# Patient Record
Sex: FEMALE | Race: WHITE | NOT HISPANIC OR LATINO | URBAN - METROPOLITAN AREA
[De-identification: names, ages, dates, MRNs, and addresses within clinical notes are randomized per-mention and may not be internally consistent; named-entity substitution may affect disease eponyms.]

---

## 2022-11-21 ENCOUNTER — OUTPATIENT (OUTPATIENT)
Dept: OUTPATIENT SERVICES | Facility: HOSPITAL | Age: 67
LOS: 1 days | End: 2022-11-21
Payer: COMMERCIAL

## 2022-11-21 DIAGNOSIS — F20.9 SCHIZOPHRENIA, UNSPECIFIED: ICD-10-CM

## 2022-11-21 PROCEDURE — 0225U NFCT DS DNA&RNA 21 SARSCOV2: CPT

## 2023-11-26 ENCOUNTER — INPATIENT (INPATIENT)
Facility: HOSPITAL | Age: 68
LOS: 8 days | Discharge: PSYCHIATRIC OR SUBSTANCE USE FACILITY | DRG: 872 | End: 2023-12-05
Attending: INTERNAL MEDICINE | Admitting: STUDENT IN AN ORGANIZED HEALTH CARE EDUCATION/TRAINING PROGRAM
Payer: MEDICARE

## 2023-11-26 VITALS
HEART RATE: 104 BPM | DIASTOLIC BLOOD PRESSURE: 56 MMHG | SYSTOLIC BLOOD PRESSURE: 110 MMHG | OXYGEN SATURATION: 95 % | RESPIRATION RATE: 20 BRPM

## 2023-11-26 LAB
ALBUMIN SERPL ELPH-MCNC: 3.3 G/DL — SIGNIFICANT CHANGE UP (ref 3.3–5.2)
ALP SERPL-CCNC: 127 U/L — HIGH (ref 40–120)
ALT FLD-CCNC: 56 U/L — HIGH
ANION GAP SERPL CALC-SCNC: 11 MMOL/L — SIGNIFICANT CHANGE UP (ref 5–17)
APPEARANCE UR: ABNORMAL
APTT BLD: 27.3 SEC — SIGNIFICANT CHANGE UP (ref 24.5–35.6)
AST SERPL-CCNC: 63 U/L — HIGH
BACTERIA # UR AUTO: NEGATIVE /HPF — SIGNIFICANT CHANGE UP
BASE EXCESS BLDV CALC-SCNC: 5.7 MMOL/L — HIGH (ref -2–3)
BASOPHILS # BLD AUTO: 0 K/UL — SIGNIFICANT CHANGE UP (ref 0–0.2)
BASOPHILS NFR BLD AUTO: 0 % — SIGNIFICANT CHANGE UP (ref 0–2)
BILIRUB SERPL-MCNC: 1.2 MG/DL — SIGNIFICANT CHANGE UP (ref 0.4–2)
BILIRUB UR-MCNC: ABNORMAL
BUN SERPL-MCNC: 25.7 MG/DL — HIGH (ref 8–20)
CA-I SERPL-SCNC: 1.22 MMOL/L — SIGNIFICANT CHANGE UP (ref 1.15–1.33)
CALCIUM SERPL-MCNC: 9.6 MG/DL — SIGNIFICANT CHANGE UP (ref 8.4–10.5)
CAST: 10 /LPF — HIGH (ref 0–4)
CHLORIDE BLDV-SCNC: 100 MMOL/L — SIGNIFICANT CHANGE UP (ref 96–108)
CHLORIDE SERPL-SCNC: 98 MMOL/L — SIGNIFICANT CHANGE UP (ref 96–108)
CO2 SERPL-SCNC: 27 MMOL/L — SIGNIFICANT CHANGE UP (ref 22–29)
COLOR SPEC: ABNORMAL
CREAT SERPL-MCNC: 1.19 MG/DL — SIGNIFICANT CHANGE UP (ref 0.5–1.3)
DIFF PNL FLD: ABNORMAL
EGFR: 50 ML/MIN/1.73M2 — LOW
EOSINOPHIL # BLD AUTO: 0 K/UL — SIGNIFICANT CHANGE UP (ref 0–0.5)
EOSINOPHIL NFR BLD AUTO: 0 % — SIGNIFICANT CHANGE UP (ref 0–6)
FINE GRAN CASTS #/AREA URNS AUTO: 2 — SIGNIFICANT CHANGE UP
GAS PNL BLDV: 134 MMOL/L — LOW (ref 136–145)
GAS PNL BLDV: SIGNIFICANT CHANGE UP
GLUCOSE BLDV-MCNC: 130 MG/DL — HIGH (ref 70–99)
GLUCOSE SERPL-MCNC: 129 MG/DL — HIGH (ref 70–99)
GLUCOSE UR QL: NEGATIVE MG/DL — SIGNIFICANT CHANGE UP
HCO3 BLDV-SCNC: 29 MMOL/L — SIGNIFICANT CHANGE UP (ref 22–29)
HCT VFR BLD CALC: 34 % — LOW (ref 34.5–45)
HCT VFR BLDA CALC: 36 % — SIGNIFICANT CHANGE UP
HGB BLD CALC-MCNC: 12 G/DL — SIGNIFICANT CHANGE UP (ref 11.7–16.1)
HGB BLD-MCNC: 11.5 G/DL — SIGNIFICANT CHANGE UP (ref 11.5–15.5)
INR BLD: 1.24 RATIO — HIGH (ref 0.85–1.18)
KETONES UR-MCNC: ABNORMAL MG/DL
LACTATE BLDV-MCNC: 1.4 MMOL/L — SIGNIFICANT CHANGE UP (ref 0.5–2)
LEUKOCYTE ESTERASE UR-ACNC: ABNORMAL
LYMPHOCYTES # BLD AUTO: 1.56 K/UL — SIGNIFICANT CHANGE UP (ref 1–3.3)
LYMPHOCYTES # BLD AUTO: 7 % — LOW (ref 13–44)
MANUAL SMEAR VERIFICATION: SIGNIFICANT CHANGE UP
MCHC RBC-ENTMCNC: 30.8 PG — SIGNIFICANT CHANGE UP (ref 27–34)
MCHC RBC-ENTMCNC: 33.8 GM/DL — SIGNIFICANT CHANGE UP (ref 32–36)
MCV RBC AUTO: 91.2 FL — SIGNIFICANT CHANGE UP (ref 80–100)
MONOCYTES # BLD AUTO: 0.78 K/UL — SIGNIFICANT CHANGE UP (ref 0–0.9)
MONOCYTES NFR BLD AUTO: 3.5 % — SIGNIFICANT CHANGE UP (ref 2–14)
NEUTROPHILS # BLD AUTO: 19.54 K/UL — HIGH (ref 1.8–7.4)
NEUTROPHILS NFR BLD AUTO: 87.8 % — HIGH (ref 43–77)
NITRITE UR-MCNC: POSITIVE
PCO2 BLDV: 38 MMHG — LOW (ref 39–42)
PH BLDV: 7.49 — HIGH (ref 7.32–7.43)
PH UR: 5.5 — SIGNIFICANT CHANGE UP (ref 5–8)
PLAT MORPH BLD: NORMAL — SIGNIFICANT CHANGE UP
PLATELET # BLD AUTO: 242 K/UL — SIGNIFICANT CHANGE UP (ref 150–400)
PO2 BLDV: 193 MMHG — HIGH (ref 25–45)
POLYCHROMASIA BLD QL SMEAR: SLIGHT — SIGNIFICANT CHANGE UP
POTASSIUM BLDV-SCNC: 3.5 MMOL/L — SIGNIFICANT CHANGE UP (ref 3.5–5.1)
POTASSIUM SERPL-MCNC: 3.6 MMOL/L — SIGNIFICANT CHANGE UP (ref 3.5–5.3)
POTASSIUM SERPL-SCNC: 3.6 MMOL/L — SIGNIFICANT CHANGE UP (ref 3.5–5.3)
PROT SERPL-MCNC: 7 G/DL — SIGNIFICANT CHANGE UP (ref 6.6–8.7)
PROT UR-MCNC: 300 MG/DL
PROTHROM AB SERPL-ACNC: 13.7 SEC — HIGH (ref 9.5–13)
RBC # BLD: 3.73 M/UL — LOW (ref 3.8–5.2)
RBC # FLD: 12.9 % — SIGNIFICANT CHANGE UP (ref 10.3–14.5)
RBC BLD AUTO: ABNORMAL
RBC CASTS # UR COMP ASSIST: 7 /HPF — HIGH (ref 0–4)
SAO2 % BLDV: 100 % — SIGNIFICANT CHANGE UP
SODIUM SERPL-SCNC: 136 MMOL/L — SIGNIFICANT CHANGE UP (ref 135–145)
SP GR SPEC: 1.03 — SIGNIFICANT CHANGE UP (ref 1–1.03)
SQUAMOUS # UR AUTO: 24 /HPF — HIGH (ref 0–5)
UROBILINOGEN FLD QL: 1 MG/DL — SIGNIFICANT CHANGE UP (ref 0.2–1)
VARIANT LYMPHS # BLD: 1.7 % — SIGNIFICANT CHANGE UP (ref 0–6)
WBC # BLD: 22.25 K/UL — HIGH (ref 3.8–10.5)
WBC # FLD AUTO: 22.25 K/UL — HIGH (ref 3.8–10.5)
WBC UR QL: 4 /HPF — SIGNIFICANT CHANGE UP (ref 0–5)

## 2023-11-26 PROCEDURE — 99285 EMERGENCY DEPT VISIT HI MDM: CPT | Mod: GC

## 2023-11-26 RX ORDER — MIDAZOLAM HYDROCHLORIDE 1 MG/ML
5 INJECTION, SOLUTION INTRAMUSCULAR; INTRAVENOUS ONCE
Refills: 0 | Status: DISCONTINUED | OUTPATIENT
Start: 2023-11-26 | End: 2023-11-26

## 2023-11-26 RX ORDER — DIPHENHYDRAMINE HCL 50 MG
50 CAPSULE ORAL ONCE
Refills: 0 | Status: COMPLETED | OUTPATIENT
Start: 2023-11-26 | End: 2023-11-26

## 2023-11-26 RX ORDER — HALOPERIDOL DECANOATE 100 MG/ML
5 INJECTION INTRAMUSCULAR ONCE
Refills: 0 | Status: COMPLETED | OUTPATIENT
Start: 2023-11-26 | End: 2023-11-26

## 2023-11-26 RX ADMIN — Medication 50 MILLIGRAM(S): at 20:58

## 2023-11-26 RX ADMIN — MIDAZOLAM HYDROCHLORIDE 5 MILLIGRAM(S): 1 INJECTION, SOLUTION INTRAMUSCULAR; INTRAVENOUS at 22:03

## 2023-11-26 RX ADMIN — HALOPERIDOL DECANOATE 5 MILLIGRAM(S): 100 INJECTION INTRAMUSCULAR at 20:58

## 2023-11-26 NOTE — ED PROVIDER NOTE - PRO INTERPRETER NEED 2
Mammogram non-compliant report review and/or outreach    Breast Cancer Screening      Chart review completed for the following HM test:   Care Everywhere and Media reports - updates requested and reviewed.  Mammogram       English

## 2023-11-26 NOTE — ED PROVIDER NOTE - CLINICAL SUMMARY MEDICAL DECISION MAKING FREE TEXT BOX
67 y/o female from comes to ED for "chills" per staff from [facility] poor historian non cooperative, patient declined physical exam, Urinalaysis, Vital signs, blood work. Denies fever, pain, cough, headache, nausea, vomits, diarrhea, urinary symptoms.     Plan:  1. Contacting [facility] for more information  2. Pt declining our services or evaluations at the moment. 69 y/o female from comes to ED for "chills" per staff from [facility] poor historian non cooperative, patient declined physical exam, Urinalaysis, Vital signs, blood work. Denies fever, pain, cough, headache, nausea, vomits, diarrhea, urinary symptoms.     Plan:  1. Contacting [facility] for more information  2. Pt declining our services or evaluations at the moment. 67 y/o female from comes to ED for "chills" per staff from [place] poor historian non cooperative, patient declined physical exam, Urinalaysis, Vital signs, blood work. Denies fever, pain, cough, headache, nausea, vomits, diarrhea, urinary symptoms.     Plan:  1. Contacting [facility] for more information  2. Pt declining our services or evaluations at the moment.

## 2023-11-26 NOTE — ED PROVIDER NOTE - NS ED ROS FT
Gen: No fever, no change in activity level  ENT: No congestion, no rhinorrhea  Resp: No cough, no trouble breathing  Cardiovascular: No chest pain, no palpitation  Gastrointestinal: No nausea, no vomiting, no diarrhea  :  No change in urine output; no dysuria, no hematuria  MS: No joint or muscle pain  Skin: No rashes  Neuro: No headache; no abnormal movements  Remainder negative, except as per the HPI

## 2023-11-26 NOTE — ED ADULT NURSE NOTE - OBJECTIVE STATEMENT
67 y/o female history of schizoaffective disorder presents to the ED via EMS from [facility] for fever. According to [facility] staff, pt has fevers, chills and cough/congestion. Pt refusing nursing care upon assessment, MD made aware. MD spoke to [facility] psychiatrist and was recommended to sedate pt involuntarily. Patient in no respiratory distress, no acute distress. Resp even and nonlabored. Patient safety provided with staff at bedside, call bell within reach and bed in the lowest position. 67 y/o female history of schizoaffective disorder presents to the ED via EMS from [facility] for fever. According to [facility] staff, pt has fevers, chills and cough/congestion. Pt refusing nursing care upon assessment, MD made aware. MD spoke to [facility] psychiatrist and was recommended to sedate pt involuntarily. Patient in no respiratory distress, no acute distress. Resp even and nonlabored. Patient safety provided with staff at bedside, call bell within reach and bed in the lowest position. 69 y/o female history of schizoaffective disorder presents to the ED via EMS from [facility] for fever. According to [facility] staff, pt has fevers, chills and cough/congestion. Pt refusing nursing care upon assessment, MD made aware. MD spoke to [facility] psychiatrist and was recommended to sedate pt involuntarily. Patient in no respiratory distress, no acute distress. Resp even and nonlabored. Patient safety provided with staff at bedside, call bell within reach and bed in the lowest position.

## 2023-11-26 NOTE — ED PROVIDER NOTE - PHYSICAL EXAMINATION
Pt decline physical exam. Gen: NAD, AOx3- year/location/basic situation  Head: NCAT  HEENT: EOMI, oral mucosa moist, normal conjunctiva, neck supple  Lung: CTAB, no respiratory distress  CV: rrr, no murmur, Normal perfusion  Abd: soft, NTND  MSK: +b/l LE edema, no visible deformities  Neuro: No focal neurologic deficits  Skin: No rash   Psych: flat affect

## 2023-11-26 NOTE — ED ADULT TRIAGE NOTE - CHIEF COMPLAINT QUOTE
Pt BIBA from [facility] for fever 101.6.  Hx. of schizophrenia, bipolar, violence.  Pt refusing to answer questions, refusing VS.  Per EMS, NKA.  EMS also states [facility] has outbreak of Covid in facility.  Caregiver bedside. Pt BIBA from [facility] for fever 101.6.  Hx. of schizophrenia, bipolar, violence.  Pt refusing to answer questions, refusing VS.  Per EMS, NKA.  EMS also states [facility] has outbreak of Covid in facility.  Caregiver bedside. Pt BIBA from [facility] for fever 101.6.  Hx. of schizophrenia, bipolar, violence.  Pt refusing to answer questions, refusing VS.  Per EMS, NKA.  EMS also states [facility] has outbreak of Covid in facility.  Caregiver bedside.

## 2023-11-26 NOTE — ED PROVIDER NOTE - OBJECTIVE STATEMENT
67 y/o female from comes to ED for "chills" per staff from [facility] giovany historian non cooperative, patient declined physical exam, Urinalaysis, Vital signs, blood work. Denies fever, pain, cough, headache, nausea, vomits, diarrhea, urinary symptoms. 67 y/o female from comes to ED for "chills" per staff from [facility] giovany historian non cooperative, patient declined physical exam, Urinalaysis, Vital signs, blood work. Denies fever, pain, cough, headache, nausea, vomits, diarrhea, urinary symptoms. 69 y/o female from comes to ED for "chills" per staff from [place] giovany historian non cooperative, patient declined physical exam, Urinalaysis, Vital signs, blood work. Denies fever, pain, cough, headache, nausea, vomits, diarrhea, urinary symptoms.

## 2023-11-26 NOTE — ED ADULT NURSE NOTE - CHIEF COMPLAINT QUOTE
Pt BIBA from [place] for fever 101.6.  Hx. of schizophrenia, bipolar, violence.  Pt refusing to answer questions, refusing VS.  Per EMS, NKA.  EMS also states [place] [facility] has outbreak of Covid in facility.  Caregiver bedside. Pt BIBA from [facility] for fever 101.6.  Hx. of schizophrenia, bipolar, violence.  Pt refusing to answer questions, refusing VS.  Per EMS, NKA.  EMS also states [facility] has outbreak of Covid in facility.  Caregiver bedside. Pt BIBA from [facility] for fever 101.6.  Hx. of schizophrenia, bipolar, violence.  Pt refusing to answer questions, refusing VS.  Per EMS, NKA.  EMS also states [place] [facility] has outbreak of Covid in facility.  Caregiver bedside.

## 2023-11-26 NOTE — ED ADULT NURSE REASSESSMENT NOTE - NS ED NURSE REASSESS COMMENT FT1
Pt refusing all types nursing care and given haldol, benadryl and versed as per MD orders. Pt more compliant after medications to obtain vitals, IV insertion, blood work and collect urine. Pt placed on CM and .  Pt in no acute distress, no resp distress, resps even and nonlabored, resting comfortably in bed with [place] staff at bedside. Pt refusing all types nursing care and given haldol, benadryl and versed as per MD orders. Pt more compliant after medications to obtain vitals, IV insertion, blood work and collect urine. Pt placed on CM and .  Pt in no acute distress, no resp distress, resps even and nonlabored, resting comfortably in bed with [facility] staff at bedside. Pt refusing all types nursing care and given haldol, benadryl and versed as per MD orders. Pt more compliant after medications to obtain vitals, IV insertion, blood work and collect urine. Pt placed on CM and .  Pt in no acute distress, no resp distress, resps even and nonlabored, resting comfortably in bed with [place] staff at bedside.

## 2023-11-26 NOTE — ED PROVIDER NOTE - ATTENDING CONTRIBUTION TO CARE
I, Janneth Camp, have personally seen and examined this patient. I have fully participated in the care of this patient. I have reviewed all pertinent clinical information, including history, physical exam, plan and the Resident's note and agree except as noted below.     88-year-old female with bipolar and schizoaffective disorder involuntarily committed to [facility] presenting for fever 1 day 101.  Had a negative rapid COVID there was COVID exposure at [facility].  Spoke with psychiatrist Dr Abdullahi Vora  who states patient does not have the ability to refuse at this time due to psychiatric illness and concern for bacterial illness with elevated temperature and negative COVID.  Will give IM sedation as patient is refusing vitals and lab work.  Tried to persuade patient to cooperate and she refused and got slightly agitated grabbing at staff.  On exam patient has no obvious findings abdomen is soft and nontender lungs are clear mild lower extremity swelling.  sepsis labs ordered urine chest x-ray reassess I, Janneth Camp, have personally seen and examined this patient. I have fully participated in the care of this patient. I have reviewed all pertinent clinical information, including history, physical exam, plan and the Resident's note and agree except as noted below.     88-year-old female with bipolar and schizoaffective disorder involuntarily committed to [facility] presenting for fever 1 day 101.  Had a negative rapid COVID there was COVID exposure at [place].  Spoke with psychiatrist Dr Abdullahi Vora  who states patient does not have the ability to refuse at this time due to psychiatric illness and concern for bacterial illness with elevated temperature and negative COVID.  Will give IM sedation as patient is refusing vitals and lab work.  Tried to persuade patient to cooperate and she refused and got slightly agitated grabbing at staff.  On exam patient has no obvious findings abdomen is soft and nontender lungs are clear mild lower extremity swelling.  sepsis labs ordered urine chest x-ray reassess I, Janneth Camp, have personally seen and examined this patient. I have fully participated in the care of this patient. I have reviewed all pertinent clinical information, including history, physical exam, plan and the Resident's note and agree except as noted below.     88-year-old female with bipolar and schizoaffective disorder involuntarily committed to [facility] presenting for fever 1 day 101.  Had a negative rapid COVID there was COVID exposure at [facility].  Spoke with psychiatrist Dr Abdullahi Vora  who states patient does not have the ability to refuse at this time due to psychiatric illness and concern for bacterial illness with elevated temperature and negative COVID.  Will give IM sedation as patient is refusing vitals and lab work.  Tried to persuade patient to cooperate and she refused and got slightly agitated grabbing at staff.  On exam patient has no obvious findings abdomen is soft and nontender lungs are clear mild lower extremity swelling.  sepsis labs ordered urine chest x-ray reassess

## 2023-11-26 NOTE — ED ADULT NURSE NOTE - NSFALLUNIVINTERV_ED_ALL_ED
Bed/Stretcher in lowest position, wheels locked, appropriate side rails in place/Call bell, personal items and telephone in reach/Instruct patient to call for assistance before getting out of bed/chair/stretcher/Non-slip footwear applied when patient is off stretcher/[place] to call system/Physically safe environment - no spills, clutter or unnecessary equipment/Purposeful proactive rounding/Room/bathroom lighting operational, light cord in reach Bed/Stretcher in lowest position, wheels locked, appropriate side rails in place/Call bell, personal items and telephone in reach/Instruct patient to call for assistance before getting out of bed/chair/stretcher/Non-slip footwear applied when patient is off stretcher/[place] to call system/Physically safe environment - no spills, clutter or unnecessary equipment/Purposeful proactive rounding/Room/bathroom lighting operational, light cord in reach Bed/Stretcher in lowest position, wheels locked, appropriate side rails in place/Call bell, personal items and telephone in reach/Instruct patient to call for assistance before getting out of bed/chair/stretcher/Non-slip footwear applied when patient is off stretcher/[place] to call system/Physically safe environment - no spills, clutter or unnecessary equipment/Purposeful proactive rounding/Room/bathroom lighting operational, light cord in reach

## 2023-11-27 DIAGNOSIS — N39.0 URINARY TRACT INFECTION, SITE NOT SPECIFIED: ICD-10-CM

## 2023-11-27 DIAGNOSIS — E55.9 VITAMIN D DEFICIENCY, UNSPECIFIED: ICD-10-CM

## 2023-11-27 DIAGNOSIS — E78.5 HYPERLIPIDEMIA, UNSPECIFIED: ICD-10-CM

## 2023-11-27 DIAGNOSIS — I10 ESSENTIAL (PRIMARY) HYPERTENSION: ICD-10-CM

## 2023-11-27 DIAGNOSIS — Z78.9 OTHER SPECIFIED HEALTH STATUS: Chronic | ICD-10-CM

## 2023-11-27 LAB
-  K. PNEUMONIAE GROUP: SIGNIFICANT CHANGE UP
CULTURE RESULTS: NO GROWTH — SIGNIFICANT CHANGE UP
GRAM STN FLD: ABNORMAL
METHOD TYPE: SIGNIFICANT CHANGE UP
RAPID RVP RESULT: SIGNIFICANT CHANGE UP
SARS-COV-2 RNA SPEC QL NAA+PROBE: SIGNIFICANT CHANGE UP
SPECIMEN SOURCE: SIGNIFICANT CHANGE UP

## 2023-11-27 PROCEDURE — 99221 1ST HOSP IP/OBS SF/LOW 40: CPT

## 2023-11-27 PROCEDURE — 99223 1ST HOSP IP/OBS HIGH 75: CPT

## 2023-11-27 RX ORDER — HALOPERIDOL DECANOATE 100 MG/ML
2 INJECTION INTRAMUSCULAR EVERY 6 HOURS
Refills: 0 | Status: DISCONTINUED | OUTPATIENT
Start: 2023-11-27 | End: 2023-11-27

## 2023-11-27 RX ORDER — ENOXAPARIN SODIUM 100 MG/ML
40 INJECTION SUBCUTANEOUS EVERY 24 HOURS
Refills: 0 | Status: DISCONTINUED | OUTPATIENT
Start: 2023-11-27 | End: 2023-12-05

## 2023-11-27 RX ORDER — ACETAMINOPHEN 500 MG
650 TABLET ORAL EVERY 6 HOURS
Refills: 0 | Status: DISCONTINUED | OUTPATIENT
Start: 2023-11-27 | End: 2023-12-05

## 2023-11-27 RX ORDER — ONDANSETRON 8 MG/1
4 TABLET, FILM COATED ORAL EVERY 6 HOURS
Refills: 0 | Status: DISCONTINUED | OUTPATIENT
Start: 2023-11-27 | End: 2023-12-05

## 2023-11-27 RX ORDER — CEFTRIAXONE 500 MG/1
1000 INJECTION, POWDER, FOR SOLUTION INTRAMUSCULAR; INTRAVENOUS ONCE
Refills: 0 | Status: COMPLETED | OUTPATIENT
Start: 2023-11-27 | End: 2023-11-27

## 2023-11-27 RX ORDER — HALOPERIDOL DECANOATE 100 MG/ML
2.5 INJECTION INTRAMUSCULAR EVERY 6 HOURS
Refills: 0 | Status: DISCONTINUED | OUTPATIENT
Start: 2023-11-27 | End: 2023-11-28

## 2023-11-27 RX ORDER — CEFTRIAXONE 500 MG/1
1000 INJECTION, POWDER, FOR SOLUTION INTRAMUSCULAR; INTRAVENOUS EVERY 24 HOURS
Refills: 0 | Status: DISCONTINUED | OUTPATIENT
Start: 2023-11-28 | End: 2023-11-28

## 2023-11-27 RX ADMIN — CEFTRIAXONE 1000 MILLIGRAM(S): 500 INJECTION, POWDER, FOR SOLUTION INTRAMUSCULAR; INTRAVENOUS at 04:38

## 2023-11-27 NOTE — BH CONSULTATION LIAISON ASSESSMENT NOTE - OTHER
chart [place] transfer packet chart [place] transfer packet chart [place] transfer packet [place] external transfer packet [facility] psych center [facility] psych center [facility] psych center

## 2023-11-27 NOTE — BH CONSULTATION LIAISON ASSESSMENT NOTE - NSBHCONSULTRECOMMENDOTHER_PSY_A_CORE FT
haloperidol oral solution 10 mg PO daily and 5 mg po hs when able to take PO meds    surrogate consent from MARYLU [facility] [phone] (per [place] chart) no HCP noted haloperidol oral solution 10 mg PO daily and 5 mg po hs when able to take PO meds    surrogate consent from MARYLU [facility] [phone] (per [place] chart) no HCP noted haloperidol oral solution 10 mg PO daily and 5 mg po hs when able to take PO meds    surrogate consent from MARYLU Wilcox [phone] (per [place] chart) no HCP noted

## 2023-11-27 NOTE — BH CONSULTATION LIAISON ASSESSMENT NOTE - NSBHREFEROTHER_PSY_A_CORE_FT
[facility] nursing office [phone] [facility] nursing office [phone] [facility] nursing office [phone]

## 2023-11-27 NOTE — H&P ADULT - HISTORY OF PRESENT ILLNESS
68yoF hx schizoaffective disorder, bipolar type sent from [facility] for fever of 101.6.  Hx obtained from chart review as pt sedated from receiving meds by ED and not providing any meaningful information at this time.  Pt with fever as above and was also noted to be tachycardic.  Pt refused to take BP at facility and per documentation as a tendency to not comply with vital sign checks and testing. It is unclear if pt reported any symptoms to staff,  Upon arrival to ED, pt refused vital signs and testing and had to be sedated with multiple agents.  Labs notable for leukocytosis and abnormal UA. 68yoF hx schizoaffective disorder, bipolar type sent from [facility] for fever of 101.6.  Hx obtained from chart review as pt sedated from receiving meds by ED and not providing any meaningful information at this time.  Pt with fever as above and was also noted to be tachycardic.  Pt refused to take BP at facility and per documentation as a tendency to not comply with vital sign checks and testing. It is unclear if pt reported any symptoms to staff,  Upon arrival to ED, pt refused vital signs and testing and had to be sedated with multiple agents.  Labs notable for leukocytosis and abnormal UA. 68yoF hx schizoaffective disorder, bipolar type sent from [facility] for fever of 101.6.  Hx obtained from chart review as pt sedated from receiving meds by ED and not providing any meaningful information at this time.  Pt with fever as above and was also noted to be tachycardic.  Pt refused to take BP at facility and per documentation as a tendency to not comply with vital sign checks and testing. It is unclear if pt reported any symptoms to staff,  Upon arrival to ED, pt refused vital signs and testing and had to be sedated with multiple agents.  Labs notable for leukocytosis and abnormal UA.

## 2023-11-27 NOTE — BH CONSULTATION LIAISON ASSESSMENT NOTE - NSBHCHARTREVIEWVS_PSY_A_CORE FT
Vital Signs Last 24 Hrs  T(C): 36.6 (27 Nov 2023 00:13), Max: 36.6 (27 Nov 2023 00:13)  T(F): 97.8 (27 Nov 2023 00:13), Max: 97.8 (27 Nov 2023 00:13)  HR: 99 (27 Nov 2023 00:13) (99 - 104)  BP: 114/58 (27 Nov 2023 00:13) (110/56 - 114/58)  BP(mean): --  RR: 18 (27 Nov 2023 00:13) (18 - 20)  SpO2: 92% (27 Nov 2023 00:13) (92% - 95%)    Parameters below as of 27 Nov 2023 00:13  Patient On (Oxygen Delivery Method): room air

## 2023-11-27 NOTE — BH CONSULTATION LIAISON ASSESSMENT NOTE - DETAILS
Samara Anderson -[phone] [place] Samara Anderson -[phone] [place] Samara Anderson -[phone] [place] NA

## 2023-11-27 NOTE — BH CONSULTATION LIAISON ASSESSMENT NOTE - CURRENT MEDICATION
MEDICATIONS  (STANDING):  acetaminophen  Suppository .. 650 milliGRAM(s) Rectal every 6 hours  enoxaparin Injectable 40 milliGRAM(s) SubCutaneous every 24 hours    MEDICATIONS  (PRN):  haloperidol    Injectable 2 milliGRAM(s) IV Push every 6 hours PRN Agitation  ondansetron Injectable 4 milliGRAM(s) IV Push every 6 hours PRN Nausea and/or Vomiting

## 2023-11-27 NOTE — PROVIDER CONTACT NOTE (CRITICAL VALUE NOTIFICATION) - PERSON GIVING RESULT:
Iraj Ortiz / [facility] Iraj Ortiz / [facility] Iraj Ortiz / [facility]

## 2023-11-27 NOTE — H&P ADULT - NSHPPHYSICALEXAM_GEN_ALL_CORE
Vital Signs Last 24 Hrs  T(C): 36.6 (27 Nov 2023 00:13), Max: 36.6 (27 Nov 2023 00:13)  T(F): 97.8 (27 Nov 2023 00:13), Max: 97.8 (27 Nov 2023 00:13)  HR: 99 (27 Nov 2023 00:13) (99 - 104)  BP: 114/58 (27 Nov 2023 00:13) (110/56 - 114/58)  BP(mean): --  RR: 18 (27 Nov 2023 00:13) (18 - 20)  SpO2: 92% (27 Nov 2023 00:13) (92% - 95%)    Parameters below as of 27 Nov 2023 00:13  Patient On (Oxygen Delivery Method): room air    GENERAL: Lethargic middle age woman, easily agitated   EYES:  Clear conjunctiva, extraocular movement intact  ENT: Moist mucous membranes  RESP:  Non-labored breathing pattern, declines to have ausculation performed   CV: Declines chest exam no evident lower extremity edema  GI: Soft, non-distended  NEURO: Somewhat lethargic but able to speak and noted to move some extremities spontaneously, declines to follow commands  PSYCH: Not cooperative, becomes agitated when spoken to   SKIN: No obvious rash or lesions

## 2023-11-27 NOTE — CHART NOTE - NSCHARTNOTEFT_GEN_A_CORE
Pt seen and examined at bedside this AM. Unable to obtain history due to psychiatric disorder.     VITALS:   T(C): 36.6 (11-27-23 @ 00:13), Max: 36.6 (11-27-23 @ 00:13)  HR: 99 (11-27-23 @ 00:13) (99 - 104)  BP: 114/58 (11-27-23 @ 00:13) (110/56 - 114/58)  RR: 18 (11-27-23 @ 00:13) (18 - 20)  SpO2: 92% (11-27-23 @ 00:13) (92% - 95%)    GENERAL: sleeping but become agitated when awake  HEAD:  Atraumatic, Normocephalic  NECK: Supple, No JVD  CHEST/LUNG: Unable to exam due to pt refusal  HEART: Unable to exam due to pt refusal  ABDOMEN: Unable to exam due to pt refusal  NERVOUS SYSTEM:  Unable to assess, moving all extremities     #UTI  f/u BCx/UCx  CTX    #schizoaffective disorder, bipolar type with behavioral disturbance  psych consulted, kenzie recs    rest of care per HPI

## 2023-11-27 NOTE — H&P ADULT - ASSESSMENT
68yoF hx schizoaffective disorder, bipolar type sent from [facility] for fever of 101.6, on admission found to have leukocytosis and abnormal UA    Suspected UTI  -Pt poor historian, unclear if active urinary symptoms  -Received ceftriaxone by ED, will continue  -Urine and blood cultures pending    Leukocytosis  -Due to infectious process above, monitor  -CXR ordered and pending    Hx schizoaffective disorder, bipolar type with behavioral disturbance  -Received IM haldol, IM versed and IM Benadryl  by ED for agitation   -Somewhat sedated, but beginning to wake up and speak  -RN to perform bedside dysphagia screen, please follow up, will keep NPO in meantime  -On long-acting haldol injections and haldol solution per transfer meds  -Will order IV haldol 2mg PRN agitation for now  -Please call psychiatry consult in AM    Prophylactic measure  -Lovenox 40mg daily 68yoF hx schizoaffective disorder, bipolar type sent from [facility] for fever of 101.6, on admission found to have leukocytosis and abnormal UA    Suspected UTI  -Pt poor historian, unclear if active urinary symptoms  -Received ceftriaxone by ED, will continue  -Urine and blood cultures pending    Leukocytosis  -Due to infectious process above, monitor  -CXR ordered and pending    Hx schizoaffective disorder, bipolar type with behavioral disturbance  -Received IM haldol, IM versed and IM Benadryl  by ED for agitation   -Somewhat sedated, but beginning to wake up and speak  -RN to perform bedside dysphagia screen, please follow up, will keep NPO in meantime  -On long-acting haldol injections and haldol solution per transfer meds  -Will order IV haldol 2mg PRN agitation for now  -Please call psychiatry consult in AM    Prophylactic measure  -Lovenox 40mg daily 68yoF hx schizoaffective disorder, bipolar type sent from [facility] for fever of 101.6, on admission found to have leukocytosis and abnormal UA    Suspected UTI  -Pt poor historian, unclear if active urinary symptoms  -Received ceftriaxone by ED, will continue  -Urine and blood cultures pending    Leukocytosis  -Due to infectious process above, monitor  -CXR ordered and pending    Hx schizoaffective disorder, bipolar type with behavioral disturbance  -Received IM haldol, IM versed and IM Benadryl  by ED for agitation   -Somewhat sedated, but beginning to wake up and speak  -RN to perform bedside dysphagia screen, please follow up, will keep NPO in meantime  -On long-acting haldol injections and haldol solution per transfer meds  -Will order IV haldol 2mg PRN agitation for now  -Please call psychiatry consult in AM    Prophylactic measure  -Lovenox 40mg daily

## 2023-11-27 NOTE — BH CONSULTATION LIAISON ASSESSMENT NOTE - HPI (INCLUDE ILLNESS QUALITY, SEVERITY, DURATION, TIMING, CONTEXT, MODIFYING FACTORS, ASSOCIATED SIGNS AND SYMPTOMS)
admitted with UTI - sepsis  patient has been agitated then non verbal aide reports she can talk but chooses not to answer questions  onset of fever chills tachycardia  found to have UTI  received haloperidol decanoate 400 mg IM on 11/15/23  reported non compliance with vital signs and medications - history of treatment over objection  h/o self injurious behaviors  multiple suicide attempts 2011 2012 and aggression

## 2023-11-27 NOTE — H&P ADULT - NSHPLABSRESULTS_GEN_ALL_CORE
11-26    136  |  98  |  25.7<H>  ----------------------------<  129<H>  3.6   |  27.0  |  1.19    Ca    9.6      26 Nov 2023 22:47    TPro  7.0  /  Alb  3.3  /  TBili  1.2  /  DBili  x   /  AST  63<H>  /  ALT  56<H>  /  AlkPhos  127<H>  11-26                            11.5   22.25 )-----------( 242      ( 26 Nov 2023 22:47 )             34.0

## 2023-11-27 NOTE — BH CONSULTATION LIAISON ASSESSMENT NOTE - RISK ASSESSMENT
elevated risk of aggressive or self injurious behaviors per history in [place] transfer summary elevated risk of aggressive or self injurious behaviors per history in [place] transfer summary elevated risk of aggressive or self injurious behaviors per history in [place] transfer summary

## 2023-11-27 NOTE — ED ADULT NURSE REASSESSMENT NOTE - NS ED NURSE REASSESS COMMENT FT1
pt awake, resps even and unlabored. Pt refusing being on CM and . Pt refusing all other interventions at this time. MD [place] made aware.

## 2023-11-27 NOTE — BH CONSULTATION LIAISON ASSESSMENT NOTE - SUMMARY
68 yr old female from [place] admitted for urosepsis h/o chronic schizoaffective disorder resistive to treatment 68 yr old female from [place] admitted for urosepsis h/o chronic schizoaffective disorder resistive to treatment 68 yr old female from [place] admitted for urosepsis h/o chronic schizoaffective disorder resistive to treatment

## 2023-11-27 NOTE — CHART NOTE - NSCHARTNOTEFT_GEN_A_CORE
Called by RN w/ prelim result of +BC w/ gram negative rods in anaerobic bottle.  Pt is on Rocephin, per RN, pt hasn't been compliant w/ IV placement, day team aware.  Informed RN to place IV and start antibiotics. Called by RN w/ prelim result of +BC w/ gram negative rods.  Pt is on Rocephin, per RN, pt hasn't been compliant w/ IV placement, day team aware.  Informed RN to place IV and start antibiotics.  Will place ID consult. Called by RN w/ prelim result of +BC w/ gram negative rods.  Pt is on Rocephin, per RN, pt hasn't been compliant w/ IV placement, day team aware.  Informed RN to place IV and start antibiotics.  Will place ID consult.    23:00  Called by RN after obtaining peripheral IV, pt attempts to remove it, needed to be constantly redirected by pilgrim 1:1.  Pt w/ schizoaffective disorder, as per  consult, has poor insight on need for treatment, non compliance.  Pulled out previously placed IV in ED as per RN.  Due to medical management interference, will place b/l wrist restraint.  RN to reevaluate need for continued restraint and d/c if appropriate. Called by RN w/ prelim result of +BC w/ gram negative rods.  Pt is on Rocephin, per RN, pt hasn't been compliant w/ IV placement, day team aware.  Informed RN to place IV and start antibiotics.  Will place ID consult.    23:00  Called by RN after obtaining peripheral IV, pt attempts to remove it, needed to be constantly redirected by pilgrim 1:1.  Pt w/ schizoaffective disorder, as per  consult, has poor insight on need for treatment, non compliance.  Pulled out previously placed IV in ED as per RN.  Due to medical management interference, will place b/l wrist restraint.  RN to reevaluate need for continued restraint and d/c if appropriate.    11/28  00:45  ICU Vital Signs Last 24 Hrs  T(C): 39.2 (28 Nov 2023 00:35), Max: 39.2 (28 Nov 2023 00:35)  T(F): 102.6 (28 Nov 2023 00:35), Max: 102.6 (28 Nov 2023 00:35) Axillary  HR: 144 (28 Nov 2023 00:35) (144 - 144)  BP: 129/79 (28 Nov 2023 00:35) (129/79 - 129/79)  BP(mean): --  ABP: --  ABP(mean): --  RR: 18 (28 Nov 2023 00:35) (18 - 18)  SpO2: 94% (28 Nov 2023 00:35) (94% - 94%)    O2 Parameters below as of 28 Nov 2023 00:35  Patient On (Oxygen Delivery Method): room air    Ofirmev 1gm IVPB stat  LR 1L IVF bolus stat  Monitor and reassess for change in pt's status Called by RN w/ prelim result of +BC w/ gram negative rods.  Pt is on Rocephin, per RN, pt hasn't been compliant w/ IV placement, day team aware.  Informed RN to place IV and start antibiotics.  Will place ID consult.    23:00  Called by RN after obtaining peripheral IV, pt attempts to remove it, needed to be constantly redirected by pilgrim 1:1.  Pt w/ schizoaffective disorder, as per BH consult, has poor insight on need for treatment, non compliance.  Pulled out previously placed IV in ED as per RN.  Due to medical management interference, will place b/l wrist restraint.  RN to reevaluate need for continued restraint and d/c if appropriate.    11/28  00:45  ICU Vital Signs Last 24 Hrs  T(C): 39.2 (28 Nov 2023 00:35), Max: 39.2 (28 Nov 2023 00:35)  T(F): 102.6 (28 Nov 2023 00:35), Max: 102.6 (28 Nov 2023 00:35) Axillary  HR: 144 (28 Nov 2023 00:35) (144 - 144)  BP: 129/79 (28 Nov 2023 00:35) (129/79 - 129/79)  BP(mean): --  ABP: --  ABP(mean): --  RR: 18 (28 Nov 2023 00:35) (18 - 18)  SpO2: 94% (28 Nov 2023 00:35) (94% - 94%)    O2 Parameters below as of 28 Nov 2023 00:35  Patient On (Oxygen Delivery Method): room air    Ofirmev 1gm IVPB stat  LR 1L IVF bolus stat  Monitor and reassess for change in pt's status    01:45  As per ID Pharmacist recommendation, Rocephin changed to 2gm daily.

## 2023-11-27 NOTE — PROVIDER CONTACT NOTE (CRITICAL VALUE NOTIFICATION) - PERSON GIVING RESULT:
[facility] / [facility] Iraj Ortiz / [facility] Iraj Ortiz / [facility]

## 2023-11-27 NOTE — ED ADULT NURSE REASSESSMENT NOTE - NS ED NURSE REASSESS COMMENT FT1
Received patient in bed, appears comfortable at this time.  Pt is noncompliant with monitoring and v/s at this time, pt removed IV line, receiving RN aware.  Per report medicine team aware.  Safety maintained with bed locked in lowest position.

## 2023-11-28 DIAGNOSIS — F25.9 SCHIZOAFFECTIVE DISORDER, UNSPECIFIED: ICD-10-CM

## 2023-11-28 LAB
ALBUMIN SERPL ELPH-MCNC: 2.6 G/DL — LOW (ref 3.3–5.2)
ALP SERPL-CCNC: 145 U/L — HIGH (ref 40–120)
ALT FLD-CCNC: 46 U/L — HIGH
ANION GAP SERPL CALC-SCNC: 12 MMOL/L — SIGNIFICANT CHANGE UP (ref 5–17)
AST SERPL-CCNC: 40 U/L — HIGH
BILIRUB SERPL-MCNC: 0.3 MG/DL — LOW (ref 0.4–2)
BUN SERPL-MCNC: 19.3 MG/DL — SIGNIFICANT CHANGE UP (ref 8–20)
CALCIUM SERPL-MCNC: 8.8 MG/DL — SIGNIFICANT CHANGE UP (ref 8.4–10.5)
CHLORIDE SERPL-SCNC: 102 MMOL/L — SIGNIFICANT CHANGE UP (ref 96–108)
CO2 SERPL-SCNC: 25 MMOL/L — SIGNIFICANT CHANGE UP (ref 22–29)
CREAT SERPL-MCNC: 0.85 MG/DL — SIGNIFICANT CHANGE UP (ref 0.5–1.3)
EGFR: 76 ML/MIN/1.73M2 — SIGNIFICANT CHANGE UP
GLUCOSE SERPL-MCNC: 133 MG/DL — HIGH (ref 70–99)
HCT VFR BLD CALC: 32.1 % — LOW (ref 34.5–45)
HCV AB S/CO SERPL IA: 0.09 S/CO — SIGNIFICANT CHANGE UP (ref 0–0.99)
HCV AB SERPL-IMP: SIGNIFICANT CHANGE UP
HGB BLD-MCNC: 10.5 G/DL — LOW (ref 11.5–15.5)
MCHC RBC-ENTMCNC: 30.5 PG — SIGNIFICANT CHANGE UP (ref 27–34)
MCHC RBC-ENTMCNC: 32.7 GM/DL — SIGNIFICANT CHANGE UP (ref 32–36)
MCV RBC AUTO: 93.3 FL — SIGNIFICANT CHANGE UP (ref 80–100)
PLATELET # BLD AUTO: 240 K/UL — SIGNIFICANT CHANGE UP (ref 150–400)
POTASSIUM SERPL-MCNC: 3.6 MMOL/L — SIGNIFICANT CHANGE UP (ref 3.5–5.3)
POTASSIUM SERPL-SCNC: 3.6 MMOL/L — SIGNIFICANT CHANGE UP (ref 3.5–5.3)
PROT SERPL-MCNC: 6.5 G/DL — LOW (ref 6.6–8.7)
RBC # BLD: 3.44 M/UL — LOW (ref 3.8–5.2)
RBC # FLD: 13.4 % — SIGNIFICANT CHANGE UP (ref 10.3–14.5)
SODIUM SERPL-SCNC: 139 MMOL/L — SIGNIFICANT CHANGE UP (ref 135–145)
WBC # BLD: 15.52 K/UL — HIGH (ref 3.8–10.5)
WBC # FLD AUTO: 15.52 K/UL — HIGH (ref 3.8–10.5)

## 2023-11-28 PROCEDURE — 99231 SBSQ HOSP IP/OBS SF/LOW 25: CPT

## 2023-11-28 PROCEDURE — 99233 SBSQ HOSP IP/OBS HIGH 50: CPT

## 2023-11-28 PROCEDURE — 99223 1ST HOSP IP/OBS HIGH 75: CPT

## 2023-11-28 RX ORDER — SODIUM CHLORIDE 9 MG/ML
1000 INJECTION, SOLUTION INTRAVENOUS ONCE
Refills: 0 | Status: COMPLETED | OUTPATIENT
Start: 2023-11-28 | End: 2023-11-28

## 2023-11-28 RX ORDER — ACETAMINOPHEN 500 MG
1000 TABLET ORAL ONCE
Refills: 0 | Status: COMPLETED | OUTPATIENT
Start: 2023-11-28 | End: 2023-11-28

## 2023-11-28 RX ORDER — CEFTRIAXONE 500 MG/1
2000 INJECTION, POWDER, FOR SOLUTION INTRAMUSCULAR; INTRAVENOUS EVERY 24 HOURS
Refills: 0 | Status: DISCONTINUED | OUTPATIENT
Start: 2023-11-28 | End: 2023-11-29

## 2023-11-28 RX ORDER — HALOPERIDOL DECANOATE 100 MG/ML
5 INJECTION INTRAMUSCULAR EVERY 6 HOURS
Refills: 0 | Status: DISCONTINUED | OUTPATIENT
Start: 2023-11-28 | End: 2023-11-28

## 2023-11-28 RX ORDER — HALOPERIDOL DECANOATE 100 MG/ML
5 INJECTION INTRAMUSCULAR EVERY 6 HOURS
Refills: 0 | Status: DISCONTINUED | OUTPATIENT
Start: 2023-11-28 | End: 2023-12-01

## 2023-11-28 RX ADMIN — Medication 400 MILLIGRAM(S): at 00:43

## 2023-11-28 RX ADMIN — SODIUM CHLORIDE 1000 MILLILITER(S): 9 INJECTION, SOLUTION INTRAVENOUS at 00:43

## 2023-11-28 RX ADMIN — HALOPERIDOL DECANOATE 5 MILLIGRAM(S): 100 INJECTION INTRAMUSCULAR at 08:53

## 2023-11-28 RX ADMIN — Medication 400 MILLIGRAM(S): at 14:38

## 2023-11-28 RX ADMIN — Medication 1000 MILLIGRAM(S): at 01:24

## 2023-11-28 RX ADMIN — CEFTRIAXONE 2000 MILLIGRAM(S): 500 INJECTION, POWDER, FOR SOLUTION INTRAMUSCULAR; INTRAVENOUS at 02:20

## 2023-11-28 NOTE — CONSULT NOTE ADULT - SUBJECTIVE AND OBJECTIVE BOX
[facility]                                                INFECTIOUS DISEASES  =======================================================                     Lloyd Olea#   Armani Reed MD#   Ismael Bolaños MD*                           Suzan Gallardo MD*   Deysi Rodriguez MD*            Diplomates American Board of Internal Medicine & Infectious Diseases                  # [place] Office - Appt - Tel  [phone] Fax [phone]                * [place] Office - Appt - Tel [phone] Fax [phone]                                  Hospital Consult line:  [phone]  =======================================================      [id number]  SARAH BUTTERFIELD   HPI:  68yoF hx schizoaffective disorder, bipolar type sent from [facility] for fever of 101.6.  Hx obtained from chart review as pt sedated from receiving meds by ED and not providing any meaningful information at this time.  Pt with fever as above and was also noted to be tachycardic.  Pt refused to take BP at facility and per documentation as a tendency to not comply with vital sign checks and testing. It is unclear if pt reported any symptoms to staff,  Upon arrival to ED, pt refused vital signs and testing and had to be sedated with multiple agents.  Labs notable for leukocytosis and abnormal UA. (27 Nov 2023 05:53)    ID called for klebsiella bacteremia  pt agitated combative uncooperative, unable to obtain any history        I have personally reviewed the labs and data; pertinent labs and data are listed in this note; please see below.   =======================================================  Past Medical & Surgical Hx:  =====================  PAST MEDICAL & SURGICAL HISTORY:  Schizoaffective disorder      Surgical history unknown        Problem List:  ==========  HEALTH ISSUES - PROBLEM Dx:  Acute UTI    Hypertension    Hyperlipidemia    Vitamin D deficiency    Schizoaffective disorder          Social Hx:  =======  no toxic habits currently    FAMILY HISTORY:  Family history unknown    no significant family history of immunosuppressive disorders in mother or father   =======================================================    REVIEW OF SYSTEMS:  limited patient answers to "no" to every question and states "i dont have an infection"    =======================================================  Allergies    No Known Allergies    Intolerances    Antibiotics:  cefTRIAXone Injectable. 2000 milliGRAM(s) IV Push every 24 hours    Other medications:  acetaminophen  Suppository .. 650 milliGRAM(s) Rectal every 6 hours  enoxaparin Injectable 40 milliGRAM(s) SubCutaneous every 24 hours   cefTRIAXone Injectable.   2000 milliGRAM(s) IV Push (11-28-23 @ 02:20)    cefTRIAXone Injectable.   1000 milliGRAM(s) IV Push (11-27-23 @ 04:38)      ======================================================  Physical Exam:  ============  T(F): 98.9 (28 Nov 2023 07:44), Max: 102.6 (28 Nov 2023 00:35)  HR: 101 (28 Nov 2023 07:44)  BP: 149/85 (28 Nov 2023 07:44)  RR: 18 (28 Nov 2023 07:44)  SpO2: 98% (28 Nov 2023 07:44) (94% - 98%)  temp max in last 48H T(F): , Max: 102.6 (11-28-23 @ 00:35)  Weight (kg): 85 (11-28-23 @ 00:35)    General:  No acute distress, elderly female sitting in bed  Respiratory: Lungs are clear to auscultation, Respirations are non-labored.  Cardiovascular: Normal rate, Regular rhythm, s1 + s2  Gastrointestinal: Soft, Non-tender, Non-distended, Normal bowel sounds.  Genitourinary: No costovertebral angle tenderness.  Integumentary: No rash.  Neurologic: Alert, Oriented, No focal deficits  Psychiatric: awake and alert , easily agitated, angry, uncooperative with exam    =======================================================  Labs:                        10.5   15.52 )-----------( 240      ( 28 Nov 2023 03:33 )             32.1     11-28    139  |  102  |  19.3  ----------------------------<  133<H>  3.6   |  25.0  |  0.85    Ca    8.8      28 Nov 2023 03:33    TPro  6.5<L>  /  Alb  2.6<L>  /  TBili  0.3<L>  /  DBili  x   /  AST  40<H>  /  ALT  46<H>  /  AlkPhos  145<H>  11-28      Culture - Blood (collected 11-26-23 @ 22:52)  Source: .Blood Blood-Peripheral  Gram Stain (11-27-23 @ 21:57):    Growth in aerobic bottle: Gram Negative Rods    Culture - Urine (collected 11-26-23 @ 22:47)  Source: Clean Catch Clean Catch (Midstream)  Final Report (11-27-23 @ 22:38):    No growth    Culture - Blood (collected 11-26-23 @ 22:47)  Source: .Blood Blood-Peripheral  Gram Stain (11-27-23 @ 19:59):    Growth in anaerobic bottle: Gram Negative Rods  Organism: Blood Culture PCR (11-27-23 @ 22:08)  Organism: Blood Culture PCR (11-27-23 @ 22:08)    Sensitivities:      Method Type: PCR      -  K. pneumoniae group: Detec (K. pneumoniae, K. quasipneumoniae, K. variicola)       SARS-CoV-2: NotDetec (11-26-23 @ 22:47)                                                   [facility]                                                INFECTIOUS DISEASES  =======================================================                     Lloyd Olea#   Armani Reed MD#   Ismael Bolaños MD*                           Suzan Gallardo MD*   Deysi Rodriguez MD*            Diplomates American Board of Internal Medicine & Infectious Diseases                  # [place] Office - Appt - Tel  [phone] Fax [phone]                * [place] [employer] Office - Appt - Tel [phone] Fax [phone]                                  Hospital Consult line:  [phone]  =======================================================      [id number]  SARAH BUTTERFIELD   HPI:  68yoF hx schizoaffective disorder, bipolar type sent from [facility] for fever of 101.6.  Hx obtained from chart review as pt sedated from receiving meds by ED and not providing any meaningful information at this time.  Pt with fever as above and was also noted to be tachycardic.  Pt refused to take BP at facility and per documentation as a tendency to not comply with vital sign checks and testing. It is unclear if pt reported any symptoms to staff,  Upon arrival to ED, pt refused vital signs and testing and had to be sedated with multiple agents.  Labs notable for leukocytosis and abnormal UA. (27 Nov 2023 05:53)    ID called for klebsiella bacteremia  pt agitated combative uncooperative, unable to obtain any history        I have personally reviewed the labs and data; pertinent labs and data are listed in this note; please see below.   =======================================================  Past Medical & Surgical Hx:  =====================  PAST MEDICAL & SURGICAL HISTORY:  Schizoaffective disorder      Surgical history unknown        Problem List:  ==========  HEALTH ISSUES - PROBLEM Dx:  Acute UTI    Hypertension    Hyperlipidemia    Vitamin D deficiency    Schizoaffective disorder          Social Hx:  =======  no toxic habits currently    FAMILY HISTORY:  Family history unknown    no significant family history of immunosuppressive disorders in mother or father   =======================================================    REVIEW OF SYSTEMS:  limited patient answers to "no" to every question and states "i dont have an infection"    =======================================================  Allergies    No Known Allergies    Intolerances    Antibiotics:  cefTRIAXone Injectable. 2000 milliGRAM(s) IV Push every 24 hours    Other medications:  acetaminophen  Suppository .. 650 milliGRAM(s) Rectal every 6 hours  enoxaparin Injectable 40 milliGRAM(s) SubCutaneous every 24 hours   cefTRIAXone Injectable.   2000 milliGRAM(s) IV Push (11-28-23 @ 02:20)    cefTRIAXone Injectable.   1000 milliGRAM(s) IV Push (11-27-23 @ 04:38)      ======================================================  Physical Exam:  ============  T(F): 98.9 (28 Nov 2023 07:44), Max: 102.6 (28 Nov 2023 00:35)  HR: 101 (28 Nov 2023 07:44)  BP: 149/85 (28 Nov 2023 07:44)  RR: 18 (28 Nov 2023 07:44)  SpO2: 98% (28 Nov 2023 07:44) (94% - 98%)  temp max in last 48H T(F): , Max: 102.6 (11-28-23 @ 00:35)  Weight (kg): 85 (11-28-23 @ 00:35)    General:  No acute distress, elderly female sitting in bed  Respiratory: Lungs are clear to auscultation, Respirations are non-labored.  Cardiovascular: Normal rate, Regular rhythm, s1 + s2  Gastrointestinal: Soft, Non-tender, Non-distended, Normal bowel sounds.  Genitourinary: No costovertebral angle tenderness.  Integumentary: No rash.  Neurologic: Alert, Oriented, No focal deficits  Psychiatric: awake and alert , easily agitated, angry, uncooperative with exam    =======================================================  Labs:                        10.5   15.52 )-----------( 240      ( 28 Nov 2023 03:33 )             32.1     11-28    139  |  102  |  19.3  ----------------------------<  133<H>  3.6   |  25.0  |  0.85    Ca    8.8      28 Nov 2023 03:33    TPro  6.5<L>  /  Alb  2.6<L>  /  TBili  0.3<L>  /  DBili  x   /  AST  40<H>  /  ALT  46<H>  /  AlkPhos  145<H>  11-28      Culture - Blood (collected 11-26-23 @ 22:52)  Source: .Blood Blood-Peripheral  Gram Stain (11-27-23 @ 21:57):    Growth in aerobic bottle: Gram Negative Rods    Culture - Urine (collected 11-26-23 @ 22:47)  Source: Clean Catch Clean Catch (Midstream)  Final Report (11-27-23 @ 22:38):    No growth    Culture - Blood (collected 11-26-23 @ 22:47)  Source: .Blood Blood-Peripheral  Gram Stain (11-27-23 @ 19:59):    Growth in anaerobic bottle: Gram Negative Rods  Organism: Blood Culture PCR (11-27-23 @ 22:08)  Organism: Blood Culture PCR (11-27-23 @ 22:08)    Sensitivities:      Method Type: PCR      -  K. pneumoniae group: Detec (K. pneumoniae, K. quasipneumoniae, K. variicola)       SARS-CoV-2: NotDetec (11-26-23 @ 22:47)                                                   [facility]                                                INFECTIOUS DISEASES  =======================================================                     Lloyd Olea#   Armani Reed MD#   Ismael Bolaños MD*                           Suzan Gallardo MD*   Deysi Rodriguez MD*            Diplomates American Board of Internal Medicine & Infectious Diseases                  # [place] Office - Appt - Tel  [phone] Fax [phone]                * [place] Office - Appt - Tel [phone] Fax [phone]                                  Hospital Consult line:  [phone]  =======================================================      [id number]  SARAH BUTTERFIELD   HPI:  68yoF hx schizoaffective disorder, bipolar type sent from [facility] for fever of 101.6.  Hx obtained from chart review as pt sedated from receiving meds by ED and not providing any meaningful information at this time.  Pt with fever as above and was also noted to be tachycardic.  Pt refused to take BP at facility and per documentation as a tendency to not comply with vital sign checks and testing. It is unclear if pt reported any symptoms to staff,  Upon arrival to ED, pt refused vital signs and testing and had to be sedated with multiple agents.  Labs notable for leukocytosis and abnormal UA. (27 Nov 2023 05:53)    ID called for klebsiella bacteremia  pt agitated combative uncooperative, unable to obtain any history        I have personally reviewed the labs and data; pertinent labs and data are listed in this note; please see below.   =======================================================  Past Medical & Surgical Hx:  =====================  PAST MEDICAL & SURGICAL HISTORY:  Schizoaffective disorder      Surgical history unknown        Problem List:  ==========  HEALTH ISSUES - PROBLEM Dx:  Acute UTI    Hypertension    Hyperlipidemia    Vitamin D deficiency    Schizoaffective disorder          Social Hx:  =======  no toxic habits currently    FAMILY HISTORY:  Family history unknown    no significant family history of immunosuppressive disorders in mother or father   =======================================================    REVIEW OF SYSTEMS:  limited patient answers to "no" to every question and states "i dont have an infection"    =======================================================  Allergies    No Known Allergies    Intolerances    Antibiotics:  cefTRIAXone Injectable. 2000 milliGRAM(s) IV Push every 24 hours    Other medications:  acetaminophen  Suppository .. 650 milliGRAM(s) Rectal every 6 hours  enoxaparin Injectable 40 milliGRAM(s) SubCutaneous every 24 hours   cefTRIAXone Injectable.   2000 milliGRAM(s) IV Push (11-28-23 @ 02:20)    cefTRIAXone Injectable.   1000 milliGRAM(s) IV Push (11-27-23 @ 04:38)      ======================================================  Physical Exam:  ============  T(F): 98.9 (28 Nov 2023 07:44), Max: 102.6 (28 Nov 2023 00:35)  HR: 101 (28 Nov 2023 07:44)  BP: 149/85 (28 Nov 2023 07:44)  RR: 18 (28 Nov 2023 07:44)  SpO2: 98% (28 Nov 2023 07:44) (94% - 98%)  temp max in last 48H T(F): , Max: 102.6 (11-28-23 @ 00:35)  Weight (kg): 85 (11-28-23 @ 00:35)    General:  No acute distress, elderly female sitting in bed  Respiratory: Lungs are clear to auscultation, Respirations are non-labored.  Cardiovascular: Normal rate, Regular rhythm, s1 + s2  Gastrointestinal: Soft, Non-tender, Non-distended, Normal bowel sounds.  Genitourinary: No costovertebral angle tenderness.  Integumentary: No rash.  Neurologic: Alert, Oriented, No focal deficits  Psychiatric: awake and alert , easily agitated, angry, uncooperative with exam    =======================================================  Labs:                        10.5   15.52 )-----------( 240      ( 28 Nov 2023 03:33 )             32.1     11-28    139  |  102  |  19.3  ----------------------------<  133<H>  3.6   |  25.0  |  0.85    Ca    8.8      28 Nov 2023 03:33    TPro  6.5<L>  /  Alb  2.6<L>  /  TBili  0.3<L>  /  DBili  x   /  AST  40<H>  /  ALT  46<H>  /  AlkPhos  145<H>  11-28      Culture - Blood (collected 11-26-23 @ 22:52)  Source: .Blood Blood-Peripheral  Gram Stain (11-27-23 @ 21:57):    Growth in aerobic bottle: Gram Negative Rods    Culture - Urine (collected 11-26-23 @ 22:47)  Source: Clean Catch Clean Catch (Midstream)  Final Report (11-27-23 @ 22:38):    No growth    Culture - Blood (collected 11-26-23 @ 22:47)  Source: .Blood Blood-Peripheral  Gram Stain (11-27-23 @ 19:59):    Growth in anaerobic bottle: Gram Negative Rods  Organism: Blood Culture PCR (11-27-23 @ 22:08)  Organism: Blood Culture PCR (11-27-23 @ 22:08)    Sensitivities:      Method Type: PCR      -  K. pneumoniae group: Detec (K. pneumoniae, K. quasipneumoniae, K. variicola)       SARS-CoV-2: NotDetec (11-26-23 @ 22:47)

## 2023-11-28 NOTE — PROGRESS NOTE ADULT - PROBLEM SELECTOR PLAN 1
orders for prn medications Ativan and Haldol  discussed behaviors for nurse  wrist restraints for safety as she is pulling out lines

## 2023-11-28 NOTE — PROGRESS NOTE ADULT - SUBJECTIVE AND OBJECTIVE BOX
"You are Raffy Jernigan" "I have nothing wrong with me!"  RN reports agitation need for wrist restraints as patient pulling out IV line needed for IV antibiotics Blood culture positive for gram negative rods form UTI  Vital Signs Last 24 Hrs  T(C): 37.2 (28 Nov 2023 07:44), Max: 39.2 (28 Nov 2023 00:35)  T(F): 98.9 (28 Nov 2023 07:44), Max: 102.6 (28 Nov 2023 00:35)  HR: 101 (28 Nov 2023 07:44) (94 - 144)  BP: 149/85 (28 Nov 2023 07:44) (129/79 - 149/85)  BP(mean): --  RR: 18 (28 Nov 2023 07:44) (18 - 18)  SpO2: 98% (28 Nov 2023 07:44) (94% - 98%)    Parameters below as of 28 Nov 2023 07:44  Patient On (Oxygen Delivery Method): room air                          10.5   15.52 )-----------( 240      ( 28 Nov 2023 03:33 )             32.1     11-28    139  |  102  |  19.3  ----------------------------<  133<H>  3.6   |  25.0  |  0.85    Ca    8.8      28 Nov 2023 03:33    TPro  6.5<L>  /  Alb  2.6<L>  /  TBili  0.3<L>  /  DBili  x   /  AST  40<H>  /  ALT  46<H>  /  AlkPhos  145<H>  11-28    LIVER FUNCTIONS - ( 28 Nov 2023 03:33 )  Alb: 2.6 g/dL / Pro: 6.5 g/dL / ALK PHOS: 145 U/L / ALT: 46 U/L / AST: 40 U/L / GGT: x           PT/INR - ( 26 Nov 2023 22:47 )   PT: 13.7 sec;   INR: 1.24 ratio         PTT - ( 26 Nov 2023 22:47 )  PTT:27.3 sec  Urinalysis Basic - ( 28 Nov 2023 03:33 )    Color: x / Appearance: x / SG: x / pH: x  Gluc: 133 mg/dL / Ketone: x  / Bili: x / Urobili: x   Blood: x / Protein: x / Nitrite: x   Leuk Esterase: x / RBC: x / WBC x   Sq Epi: x / Non Sq Epi: x / Bacteria: x    Culture Results:   Growth in aerobic bottle: Gram Negative Rods (11.26.23 @ 22:52)

## 2023-11-28 NOTE — PROGRESS NOTE ADULT - ASSESSMENT
The patient appears stated age, poor hygiene .  The patient was agitated,uncooperative with the interview and maintained poor eye contact.  psychomotor agitation is noted.   The patient's speech was fluent, loud in tone,  rapid rate, and increased volume.  The patient's mood is angry  Affect is constricted, stable and appropriate.  The patient's thoughts are disorganized.   delusions present . did not endorse  any suicidal or homicidal ideation, intent, or plan.  Insight is poor.  Judgment is impaired.  Impulse control has been poor. The patient appears stated age, poor hygiene .  The patient was agitated, uncooperative with the interview and maintained poor eye contact.  psychomotor agitation is noted.   The patient's speech was fluent, loud in tone,  rapid rate, and increased volume.  The patient's mood is angry  Affect is irritable.  The patient's thoughts are disorganized.   delusions present . did not endorse  any suicidal or homicidal ideation, intent, or plan.  Insight is poor.  Judgment is impaired.  Impulse control has been poor.

## 2023-11-28 NOTE — PROGRESS NOTE ADULT - NSPROGADDITIONALINFOA_GEN_ALL_CORE
MEDICATIONS  (PRN):  haloperidol    Injectable 2.5 milliGRAM(s) IV Push every 6 hours PRN agitation  haloperidol    Injectable 5 milliGRAM(s) IntraMuscular every 6 hours PRN Agitation  lorazepam   Injectable 1 milliGRAM(s) IntraMuscular every 6 hours PRN Agitation  MEDICATIONS  (STANDING):  acetaminophen  Suppository .. 650 milliGRAM(s) Rectal every 6 hours  ceftriaxone Injectable. 2000 milliGRAM(s) IV Push every 24 hours  enoxaparin Injectable 40 milliGRAM(s) SubCutaneous every 24 hours

## 2023-11-28 NOTE — PROGRESS NOTE ADULT - ASSESSMENT
68yoF hx schizoaffective disorder, bipolar type sent from [facility] for fever of 101.6, on admission found to have leukocytosis and abnormal UA    GNR bactermeia  -c/w CTX for now  -repeat BCx to clear  -UCx negative  -check CT A/P w/ con  -ID consulted, pending recs    #Hx schizoaffective disorder, bipolar type with behavioral disturbance  -Received IM haldol, IM versed and IM Benadryl  by ED for agitation   -Somewhat sedated, but beginning to wake up and speak  -RN to perform bedside dysphagia screen, please follow up, will keep NPO in meantime  -On long-acting haldol injections and haldol solution per transfer meds  -Will order IV haldol 2mg PRN agitation for now  -Added PRN Ativan per psych  -psych recs kenzie    Prophylactic measure -Lovenox 40mg daily   68yoF hx schizoaffective disorder, bipolar type sent from [facility] for fever of 101.6, on admission found to have leukocytosis and abnormal UA    GNR bactermeia  -c/w CTX for now  -repeat BCx to clear  -UCx negative  -check CT A/P w/ con  -ID consulted, pending recs    #Hx schizoaffective disorder, bipolar type with behavioral disturbance  -Received IM haldol, IM versed and IM Benadryl  by ED for agitation   -Somewhat sedated, but beginning to wake up and speak  -RN to perform bedside dysphagia screen, please follow up, will keep NPO in meantime  -On long-acting haldol injections and haldol solution per transfer meds  -Will order IV haldol 2mg PRN agitation for now  -Added PRN Ativan per psych  -psych recs kenzie    Prophylactic measure -Lovenox 40mg daily   68yoF hx schizoaffective disorder, bipolar type sent from [facility] for fever of 101.6, on admission found to have leukocytosis and abnormal UA    GNR bactermeia  -c/w CTX for now  -repeat BCx to clear  -UCx negative  -check CT A/P w/ con  -ID consulted, pending recs    #Hx schizoaffective disorder, bipolar type with behavioral disturbance  -Received IM haldol, IM versed and IM Benadryl  by ED for agitation   -Somewhat sedated, but beginning to wake up and speak  -RN to perform bedside dysphagia screen, please follow up, will keep NPO in meantime  -On long-acting haldol injections and haldol solution per transfer meds  -Will order IV haldol 2mg PRN agitation for now  -Added PRN Ativan per psych  -psych recs kenzie    Prophylactic measure -Lovenox 40mg daily

## 2023-11-28 NOTE — PROGRESS NOTE ADULT - SUBJECTIVE AND OBJECTIVE BOX
Aleksander Triplett M.D.    Patient is a 68y old  Female who presents with a chief complaint of UTI (28 Nov 2023 08:05)      SUBJECTIVE / OVERNIGHT EVENTS: BCx with GNR.     Patient denies chest pain, SOB, abd pain, N/V, fever, chills, dysuria or any other complaints. All remainder ROS negative.     MEDICATIONS  (STANDING):  acetaminophen  Suppository .. 650 milliGRAM(s) Rectal every 6 hours  cefTRIAXone Injectable. 2000 milliGRAM(s) IV Push every 24 hours  enoxaparin Injectable 40 milliGRAM(s) SubCutaneous every 24 hours    MEDICATIONS  (PRN):  haloperidol    Injectable 5 milliGRAM(s) IV Push every 6 hours PRN Agitation  LORazepam   Injectable 1 milliGRAM(s) IntraMuscular every 6 hours PRN Agitation  ondansetron Injectable 4 milliGRAM(s) IV Push every 6 hours PRN Nausea and/or Vomiting      I&O's Summary      PHYSICAL EXAM:  Vital Signs Last 24 Hrs  T(C): 37.2 (28 Nov 2023 07:44), Max: 39.2 (28 Nov 2023 00:35)  T(F): 98.9 (28 Nov 2023 07:44), Max: 102.6 (28 Nov 2023 00:35)  HR: 101 (28 Nov 2023 07:44) (94 - 144)  BP: 149/85 (28 Nov 2023 07:44) (129/79 - 149/85)  BP(mean): --  RR: 18 (28 Nov 2023 07:44) (18 - 18)  SpO2: 98% (28 Nov 2023 07:44) (94% - 98%)    Parameters below as of 28 Nov 2023 07:44  Patient On (Oxygen Delivery Method): room air    GENERAL: relatively calm, intermittently agitated   HEAD:  Atraumatic, Normocephalic  NECK: Supple, No JVD  CHEST/LUNG: no increased WOB  HEART: RRR, no LE edema  ABDOMEN: +BS, NTTP  NERVOUS SYSTEM:  Unable to assess, moving all extremities     LABS:                        10.5   15.52 )-----------( 240      ( 28 Nov 2023 03:33 )             32.1     11-28    139  |  102  |  19.3  ----------------------------<  133<H>  3.6   |  25.0  |  0.85    Ca    8.8      28 Nov 2023 03:33    TPro  6.5<L>  /  Alb  2.6<L>  /  TBili  0.3<L>  /  DBili  x   /  AST  40<H>  /  ALT  46<H>  /  AlkPhos  145<H>  11-28    PT/INR - ( 26 Nov 2023 22:47 )   PT: 13.7 sec;   INR: 1.24 ratio         PTT - ( 26 Nov 2023 22:47 )  PTT:27.3 sec      Urinalysis Basic - ( 28 Nov 2023 03:33 )    Color: x / Appearance: x / SG: x / pH: x  Gluc: 133 mg/dL / Ketone: x  / Bili: x / Urobili: x   Blood: x / Protein: x / Nitrite: x   Leuk Esterase: x / RBC: x / WBC x   Sq Epi: x / Non Sq Epi: x / Bacteria: x        Culture - Blood (collected 26 Nov 2023 22:52)  Source: .Blood Blood-Peripheral  Gram Stain (27 Nov 2023 21:57):    Growth in aerobic bottle: Gram Negative Rods  Preliminary Report (27 Nov 2023 21:57):    Growth in aerobic bottle: Gram Negative Rods    Culture - Urine (collected 26 Nov 2023 22:47)  Source: Clean Catch Clean Catch (Midstream)  Final Report (27 Nov 2023 22:38):    No growth    Culture - Blood (collected 26 Nov 2023 22:47)  Source: .Blood Blood-Peripheral  Gram Stain (27 Nov 2023 19:59):    Growth in anaerobic bottle: Gram Negative Rods  Preliminary Report (27 Nov 2023 20:00):    Growth in anaerobic bottle: Gram Negative Rods    Direct identification is available within approximately 3-5    hours either by Blood Panel Multiplexed PCR or Direct    MALDI-TOF. Details: [email or web address]  Organism: Blood Culture PCR (27 Nov 2023 22:08)  Organism: Blood Culture PCR (27 Nov 2023 22:08)      CAPILLARY BLOOD GLUCOSE          RADIOLOGY & ADDITIONAL TESTS:  Results Reviewed:   Imaging Personally Reviewed:  Electrocardiogram Personally Reviewed: Aleksander Triplett M.D.    Patient is a 68y old  Female who presents with a chief complaint of UTI (28 Nov 2023 08:05)      SUBJECTIVE / OVERNIGHT EVENTS: BCx with GNR.     Patient denies chest pain, SOB, abd pain, N/V, fever, chills, dysuria or any other complaints. All remainder ROS negative.     MEDICATIONS  (STANDING):  acetaminophen  Suppository .. 650 milliGRAM(s) Rectal every 6 hours  cefTRIAXone Injectable. 2000 milliGRAM(s) IV Push every 24 hours  enoxaparin Injectable 40 milliGRAM(s) SubCutaneous every 24 hours    MEDICATIONS  (PRN):  haloperidol    Injectable 5 milliGRAM(s) IV Push every 6 hours PRN Agitation  LORazepam   Injectable 1 milliGRAM(s) IntraMuscular every 6 hours PRN Agitation  ondansetron Injectable 4 milliGRAM(s) IV Push every 6 hours PRN Nausea and/or Vomiting      I&O's Summary      PHYSICAL EXAM:  Vital Signs Last 24 Hrs  T(C): 37.2 (28 Nov 2023 07:44), Max: 39.2 (28 Nov 2023 00:35)  T(F): 98.9 (28 Nov 2023 07:44), Max: 102.6 (28 Nov 2023 00:35)  HR: 101 (28 Nov 2023 07:44) (94 - 144)  BP: 149/85 (28 Nov 2023 07:44) (129/79 - 149/85)  BP(mean): --  RR: 18 (28 Nov 2023 07:44) (18 - 18)  SpO2: 98% (28 Nov 2023 07:44) (94% - 98%)    Parameters below as of 28 Nov 2023 07:44  Patient On (Oxygen Delivery Method): room air    GENERAL: relatively calm, intermittently agitated   HEAD:  Atraumatic, Normocephalic  NECK: Supple, No JVD  CHEST/LUNG: no increased WOB  HEART: RRR, no LE edema  ABDOMEN: +BS, NTTP  NERVOUS SYSTEM:  Unable to assess, moving all extremities     LABS:                        10.5   15.52 )-----------( 240      ( 28 Nov 2023 03:33 )             32.1     11-28    139  |  102  |  19.3  ----------------------------<  133<H>  3.6   |  25.0  |  0.85    Ca    8.8      28 Nov 2023 03:33    TPro  6.5<L>  /  Alb  2.6<L>  /  TBili  0.3<L>  /  DBili  x   /  AST  40<H>  /  ALT  46<H>  /  AlkPhos  145<H>  11-28    PT/INR - ( 26 Nov 2023 22:47 )   PT: 13.7 sec;   INR: 1.24 ratio         PTT - ( 26 Nov 2023 22:47 )  PTT:27.3 sec      Urinalysis Basic - ( 28 Nov 2023 03:33 )    Color: x / Appearance: x / SG: x / pH: x  Gluc: 133 mg/dL / Ketone: x  / Bili: x / Urobili: x   Blood: x / Protein: x / Nitrite: x   Leuk Esterase: x / RBC: x / WBC x   Sq Epi: x / Non Sq Epi: x / Bacteria: x        Culture - Blood (collected 26 Nov 2023 22:52)  Source: .Blood Blood-Peripheral  Gram Stain (27 Nov 2023 21:57):    Growth in aerobic bottle: Gram Negative Rods  Preliminary Report (27 Nov 2023 21:57):    Growth in aerobic bottle: Gram Negative Rods    Culture - Urine (collected 26 Nov 2023 22:47)  Source: Clean Catch Clean Catch (Midstream)  Final Report (27 Nov 2023 22:38):    No growth    Culture - Blood (collected 26 Nov 2023 22:47)  Source: .Blood Blood-Peripheral  Gram Stain (27 Nov 2023 19:59):    Growth in anaerobic bottle: Gram Negative Rods  Preliminary Report (27 Nov 2023 20:00):    Growth in anaerobic bottle: Gram Negative Rods    Direct identification is available within approximately 3-5    hours either by Blood Panel Multiplexed PCR or Direct    MALDI-TOF. Details: [email or web address]  Organism: Blood Culture PCR (27 Nov 2023 22:08)  Organism: Blood Culture PCR (27 Nov 2023 22:08)      CAPILLARY BLOOD GLUCOSE          RADIOLOGY & ADDITIONAL TESTS:  Results Reviewed:   Imaging Personally Reviewed:  Electrocardiogram Personally Reviewed: Aleksander Triplett M.D.    Patient is a 68y old  Female who presents with a chief complaint of UTI (28 Nov 2023 08:05)      SUBJECTIVE / OVERNIGHT EVENTS: BCx with GNR.     Patient denies chest pain, SOB, abd pain, N/V, fever, chills, dysuria or any other complaints. All remainder ROS negative.     MEDICATIONS  (STANDING):  acetaminophen  Suppository .. 650 milliGRAM(s) Rectal every 6 hours  cefTRIAXone Injectable. 2000 milliGRAM(s) IV Push every 24 hours  enoxaparin Injectable 40 milliGRAM(s) SubCutaneous every 24 hours    MEDICATIONS  (PRN):  haloperidol    Injectable 5 milliGRAM(s) IV Push every 6 hours PRN Agitation  LORazepam   Injectable 1 milliGRAM(s) IntraMuscular every 6 hours PRN Agitation  ondansetron Injectable 4 milliGRAM(s) IV Push every 6 hours PRN Nausea and/or Vomiting      I&O's Summary      PHYSICAL EXAM:  Vital Signs Last 24 Hrs  T(C): 37.2 (28 Nov 2023 07:44), Max: 39.2 (28 Nov 2023 00:35)  T(F): 98.9 (28 Nov 2023 07:44), Max: 102.6 (28 Nov 2023 00:35)  HR: 101 (28 Nov 2023 07:44) (94 - 144)  BP: 149/85 (28 Nov 2023 07:44) (129/79 - 149/85)  BP(mean): --  RR: 18 (28 Nov 2023 07:44) (18 - 18)  SpO2: 98% (28 Nov 2023 07:44) (94% - 98%)    Parameters below as of 28 Nov 2023 07:44  Patient On (Oxygen Delivery Method): room air    GENERAL: relatively calm, intermittently agitated   HEAD:  Atraumatic, Normocephalic  NECK: Supple, No JVD  CHEST/LUNG: no increased WOB  HEART: RRR, no LE edema  ABDOMEN: +BS, NTTP  NERVOUS SYSTEM:  Unable to assess, moving all extremities     LABS:                        10.5   15.52 )-----------( 240      ( 28 Nov 2023 03:33 )             32.1     11-28    139  |  102  |  19.3  ----------------------------<  133<H>  3.6   |  25.0  |  0.85    Ca    8.8      28 Nov 2023 03:33    TPro  6.5<L>  /  Alb  2.6<L>  /  TBili  0.3<L>  /  DBili  x   /  AST  40<H>  /  ALT  46<H>  /  AlkPhos  145<H>  11-28    PT/INR - ( 26 Nov 2023 22:47 )   PT: 13.7 sec;   INR: 1.24 ratio         PTT - ( 26 Nov 2023 22:47 )  PTT:27.3 sec      Urinalysis Basic - ( 28 Nov 2023 03:33 )    Color: x / Appearance: x / SG: x / pH: x  Gluc: 133 mg/dL / Ketone: x  / Bili: x / Urobili: x   Blood: x / Protein: x / Nitrite: x   Leuk Esterase: x / RBC: x / WBC x   Sq Epi: x / Non Sq Epi: x / Bacteria: x        Culture - Blood (collected 26 Nov 2023 22:52)  Source: .Blood Blood-Peripheral  Gram Stain (27 Nov 2023 21:57):    Growth in aerobic bottle: Gram Negative Rods  Preliminary Report (27 Nov 2023 21:57):    Growth in aerobic bottle: Gram Negative Rods    Culture - Urine (collected 26 Nov 2023 22:47)  Source: Clean Catch Clean Catch (Midstream)  Final Report (27 Nov 2023 22:38):    No growth    Culture - Blood (collected 26 Nov 2023 22:47)  Source: .Blood Blood-Peripheral  Gram Stain (27 Nov 2023 19:59):    Growth in anaerobic bottle: Gram Negative Rods  Preliminary Report (27 Nov 2023 20:00):    Growth in anaerobic bottle: Gram Negative Rods    Direct identification is available within approximately 3-5    hours either by Blood Panel Multiplexed PCR or Direct    MALDI-TOF. Details: [email or web address]  Organism: Blood Culture PCR (27 Nov 2023 22:08)  Organism: Blood Culture PCR (27 Nov 2023 22:08)      CAPILLARY BLOOD GLUCOSE          RADIOLOGY & ADDITIONAL TESTS:  Results Reviewed:   Imaging Personally Reviewed:  Electrocardiogram Personally Reviewed:

## 2023-11-28 NOTE — CONSULT NOTE ADULT - ASSESSMENT
68 F hx schizoaffective disorder, bipolar type sent from [facility] for fever of 101.6.  Hx obtained from chart review as pt sedated from receiving meds by ED and not providing any meaningful information at this time.  Pt with fever as above and was also noted to be tachycardic.  Pt refused to take BP at facility and per documentation as a tendency to not comply with vital sign checks and testing. It is unclear if pt reported any symptoms to staff,  Upon arrival to ED, pt refused vital signs and testing and had to be sedated with multiple agents.  Labs notable for leukocytosis and abnormal UA. Found to have klebsiella bacteremia. UA (-) unclear if she received antibiotics prior to arrival. Labs with leukocytosis and mildly elevated LFT    - f/u BCX 11/26 klebsiella pneumoniae f/u (s)  - f/u repeat BCX 11/28  - f/u UCX  - agree with CT a/p  - dc ceftriaxone-->zosyn  - Trend Fever  - Trend Leukocytosis  - Trend LFT      Will Follow 68 F hx schizoaffective disorder, bipolar type sent from [facility] for fever of 101.6.  Hx obtained from chart review as pt sedated from receiving meds by ED and not providing any meaningful information at this time.  Pt with fever as above and was also noted to be tachycardic.  Pt refused to take BP at facility and per documentation as a tendency to not comply with vital sign checks and testing. It is unclear if pt reported any symptoms to staff,  Upon arrival to ED, pt refused vital signs and testing and had to be sedated with multiple agents.  Labs notable for leukocytosis and abnormal UA. Found to have klebsiella bacteremia. UA (-) unclear if she received antibiotics prior to arrival. Labs with leukocytosis and mildly elevated LFT    - f/u BCX 11/26 klebsiella pneumoniae f/u (s)  - f/u repeat BCX 11/28  - f/u UCX  - agree with CT a/p  - dc ceftriaxone-->zosyn  - Trend Fever  - Trend Leukocytosis  - Trend LFT      Will Follow 68 F hx schizoaffective disorder, bipolar type sent from [facility] for fever of 101.6.  Hx obtained from chart review as pt sedated from receiving meds by ED and not providing any meaningful information at this time.  Pt with fever as above and was also noted to be tachycardic.  Pt refused to take BP at facility and per documentation as a tendency to not comply with vital sign checks and testing. It is unclear if pt reported any symptoms to staff,  Upon arrival to ED, pt refused vital signs and testing and had to be sedated with multiple agents.  Labs notable for leukocytosis and abnormal UA. Found to have klebsiella bacteremia. UA (-) unclear if she received antibiotics prior to arrival. Labs with leukocytosis and mildly elevated LFT    - f/u BCX 11/26 klebsiella pneumoniae f/u (s)  - f/u repeat BCX 11/28  - f/u UCX  - agree with CT a/p  - dc ceftriaxone-->zosyn  - Trend Fever  - Trend Leukocytosis  - Trend LFT      Will Follow

## 2023-11-28 NOTE — PHARMACOTHERAPY INTERVENTION NOTE - COMMENTS
Recommended starting ceftriaxone 2g q24h for K. pneumoniae group bacteremia.     Rubi Irizarry, PharmD   Clinical Pharmacy Specialist, Infectious Diseases  Tele-Antimicrobial Stewardship Program (Tele-ASP)  Tele-ASP Phone: [phone]   Recommended starting ceftriaxone 2g q24h for K. pneumoniae group bacteremia.     Rubi Irizarry, PharmD   Clinical Pharmacy Specialist, Infectious Diseases  Tele-Antimicrobial Stewardship Program (Tele-ASP)  Tele-ASP Phone: [phone]   Recommended starting ceftriaxone 2g q24h for K. pneumoniae group bacteremia.     Rubi Irizarry, PharmD   Clinical Pharmacy Specialist, Infectious Diseases  Tele-Antimicrobial Stewardship Program (Tele-ASP)  Tele-ASP Phone: [phone]

## 2023-11-28 NOTE — CHART NOTE - NSCHARTNOTEFT_GEN_A_CORE
Chart review for patient from [facility] (PPC). Reviewed PPC packet and appreciate Dr. Chan’s ( Attending) note.      Per Behavioral Health, the patient does not have capacity at this time. The patient's sister, Aissatou Wilcox, is her PHL surrogate per the [facility] Family Health Care Decisions Act (2010). The patient's autonomy is safe guarded by utilizing the appropriate surrogate decision maker, her sister Aissatou. Aissatou can make all medical decisions for the patient EXCEPT FOR WITHHOLDING OR WITHDRAWING OF LIFE SUSTAINING TREATMENT, which would have to be reviewed and approved by MHLS ([facility] [employer]).    Ethics and Palliative Care can assist with this.    SIGRID Barajas MA (Ethics Fellow) called Aissatou to discuss decision making and confirm her contact information. Aissatou stated their parents are  and she has been her sister's medical decision maker since they .    Aissatou Wilcox: [phone]    Amanda Barajas MA  Ethics Fellow  [phone] Chart review for patient from [facility] (PPC). Reviewed PPC packet and appreciate Dr. Chan’s ( Attending) note.      Per Behavioral Health, the patient does not have capacity at this time. The patient's sister, Aissatou Wilcox, is her PHL surrogate per the [facility] Family Health Care Decisions Act (2010). The patient's autonomy is safe guarded by utilizing the appropriate surrogate decision maker, her sister Aissatou. Aissatou can make all medical decisions for the patient EXCEPT FOR WITHHOLDING OR WITHDRAWING OF LIFE SUSTAINING TREATMENT, which would have to be reviewed and approved by MHLS ([facility] [employer]).    Ethics and Palliative Care can assist with this.    SIGRID Barajas MA (Ethics Fellow) called Aissatou to discuss decision making and confirm her contact information. Aissatou stated their parents are  and she has been her sister's medical decision maker since they .    Aissatou Wilcox: [phone]    Amanda Barajas MA  Ethics Fellow  [phone] Chart review for patient from [facility] (PPC). Reviewed PPC packet and appreciate Dr. Chan’s ( Attending) note.      Per Behavioral Health, the patient does not have capacity at this time. The patient's sister, Aissatou Wilcox, is her PHL surrogate per the [facility] Family Health Care Decisions Act (2010). The patient's autonomy is safe guarded by utilizing the appropriate surrogate decision maker, her sister Aissatou. Aissatou can make all medical decisions for the patient EXCEPT FOR WITHHOLDING OR WITHDRAWING OF LIFE SUSTAINING TREATMENT, which would have to be reviewed and approved by MHLS ([facility] [employer]).    Ethics and Palliative Care can assist with this.    SIGRID Barajas MA (Ethics Fellow) called Aissatou to discuss decision making and confirm her contact information. Aissatou stated their parents are  and she has been her sister's medical decision maker since they .    Aissatou Wilcox: [phone]    Amanda Barajas MA  Ethics Fellow  [phone] Chart review for patient from [facility] (PPC). Reviewed PPC packet and appreciate Dr. Chan’s ( Attending) note.      Per Behavioral Health, the patient does not have capacity at this time. The patient's sister, Aissatou Wilcox, is her PHL surrogate per the [facility] Family Health Care Decisions Act (2010). The patient's autonomy is safe guarded by utilizing the appropriate surrogate decision maker, her sister Aissatou. Aissatou can make all medical decisions for the patient EXCEPT FOR WITHHOLDING OR WITHDRAWING OF LIFE SUSTAINING TREATMENT, which would have to be reviewed and approved by MHLS ([facility] [employer]).    Ethics and Palliative Care can assist with this.    SIGRID Barajas MA (Ethics Fellow) called Aissatou to discuss decision making and confirm her contact information. Aissatou stated their parents are  and she has been her sister's medical decision maker since they .    Aissatou Wilcox: [phone]    Amanda Barajas MA  Ethics Fellow  [phone]    Agree with above. In addition SIGRID Barajas MA (Ethics Fellow) updated Dr. Triplett (Medicine Attending) of above information.     Radha Osorio MD  Ethics Attending  [phone] Chart review for patient from [facility] (PPC). Reviewed PPC packet and appreciate Dr. Chan’s ( Attending) note.      Per Behavioral Health, the patient does not have capacity at this time. The patient's sister, Aissatou Wilcox, is her PHL surrogate per the [facility] Family Health Care Decisions Act (2010). The patient's autonomy is safe guarded by utilizing the appropriate surrogate decision maker, her sister Aissatou. Aissatou can make all medical decisions for the patient EXCEPT FOR WITHHOLDING OR WITHDRAWING OF LIFE SUSTAINING TREATMENT, which would have to be reviewed and approved by MHLS ([facility] Legal Services).    Ethics and Palliative Care can assist with this.    SIGRID Barajas MA (Ethics Fellow) called Aissatou to discuss decision making and confirm her contact information. Aissatou stated their parents are  and she has been her sister's medical decision maker since they .    Aissatou Wilcox: [phone]    Amanda Barajas MA  Ethics Fellow  [phone]    Agree with above. In addition SIGRID Barajas MA (Ethics Fellow) updated Dr. Triplett (Medicine Attending) of above information.     Radha Osorio MD  Ethics Attending  [phone] Chart review for patient from [facility] (PPC). Reviewed PPC packet and appreciate Dr. Chan’s ( Attending) note.      Per Behavioral Health, the patient does not have capacity at this time. The patient's sister, Aissatou Wilcox, is her PHL surrogate per the [facility] Family Health Care Decisions Act (2010). The patient's autonomy is safe guarded by utilizing the appropriate surrogate decision maker, her sister Aissatou. Aissatou can make all medical decisions for the patient EXCEPT FOR WITHHOLDING OR WITHDRAWING OF LIFE SUSTAINING TREATMENT, which would have to be reviewed and approved by MHLS ([facility] Legal Services).    Ethics and Palliative Care can assist with this.    SIGRID Barajas MA (Ethics Fellow) called Aissatou to discuss decision making and confirm her contact information. Aissatou stated their parents are  and she has been her sister's medical decision maker since they .    Aissatou Wilcox: [phone]    Amanda Barajas MA  Ethics Fellow  [phone]    Agree with above. In addition SIGRID Barajas MA (Ethics Fellow) updated Dr. Triplett (Medicine Attending) of above information.     Radha Osorio MD  Ethics Attending  [phone]

## 2023-11-29 LAB
-  AMPICILLIN/SULBACTAM: SIGNIFICANT CHANGE UP
-  AMPICILLIN: SIGNIFICANT CHANGE UP
-  AZTREONAM: SIGNIFICANT CHANGE UP
-  CEFAZOLIN: SIGNIFICANT CHANGE UP
-  CEFEPIME: SIGNIFICANT CHANGE UP
-  CEFOXITIN: SIGNIFICANT CHANGE UP
-  CEFTRIAXONE: SIGNIFICANT CHANGE UP
-  CIPROFLOXACIN: SIGNIFICANT CHANGE UP
-  ERTAPENEM: SIGNIFICANT CHANGE UP
-  GENTAMICIN: SIGNIFICANT CHANGE UP
-  IMIPENEM: SIGNIFICANT CHANGE UP
-  LEVOFLOXACIN: SIGNIFICANT CHANGE UP
-  MEROPENEM: SIGNIFICANT CHANGE UP
-  PIPERACILLIN/TAZOBACTAM: SIGNIFICANT CHANGE UP
-  TOBRAMYCIN: SIGNIFICANT CHANGE UP
-  TRIMETHOPRIM/SULFAMETHOXAZOLE: SIGNIFICANT CHANGE UP
ALBUMIN SERPL ELPH-MCNC: 2.8 G/DL — LOW (ref 3.3–5.2)
ALP SERPL-CCNC: 153 U/L — HIGH (ref 40–120)
ALT FLD-CCNC: 43 U/L — HIGH
ANION GAP SERPL CALC-SCNC: 14 MMOL/L — SIGNIFICANT CHANGE UP (ref 5–17)
AST SERPL-CCNC: 38 U/L — HIGH
BILIRUB SERPL-MCNC: 0.5 MG/DL — SIGNIFICANT CHANGE UP (ref 0.4–2)
BUN SERPL-MCNC: 16.1 MG/DL — SIGNIFICANT CHANGE UP (ref 8–20)
CALCIUM SERPL-MCNC: 8.8 MG/DL — SIGNIFICANT CHANGE UP (ref 8.4–10.5)
CHLORIDE SERPL-SCNC: 101 MMOL/L — SIGNIFICANT CHANGE UP (ref 96–108)
CO2 SERPL-SCNC: 24 MMOL/L — SIGNIFICANT CHANGE UP (ref 22–29)
CREAT SERPL-MCNC: 0.66 MG/DL — SIGNIFICANT CHANGE UP (ref 0.5–1.3)
CULTURE RESULTS: ABNORMAL
EGFR: 97 ML/MIN/1.73M2 — SIGNIFICANT CHANGE UP
GLUCOSE SERPL-MCNC: 99 MG/DL — SIGNIFICANT CHANGE UP (ref 70–99)
GRAM STN FLD: ABNORMAL
HCT VFR BLD CALC: 31.4 % — LOW (ref 34.5–45)
HGB BLD-MCNC: 10.3 G/DL — LOW (ref 11.5–15.5)
MCHC RBC-ENTMCNC: 30.3 PG — SIGNIFICANT CHANGE UP (ref 27–34)
MCHC RBC-ENTMCNC: 32.8 GM/DL — SIGNIFICANT CHANGE UP (ref 32–36)
MCV RBC AUTO: 92.4 FL — SIGNIFICANT CHANGE UP (ref 80–100)
METHOD TYPE: SIGNIFICANT CHANGE UP
ORGANISM # SPEC MICROSCOPIC CNT: ABNORMAL
ORGANISM # SPEC MICROSCOPIC CNT: SIGNIFICANT CHANGE UP
PLATELET # BLD AUTO: 267 K/UL — SIGNIFICANT CHANGE UP (ref 150–400)
POTASSIUM SERPL-MCNC: 3.6 MMOL/L — SIGNIFICANT CHANGE UP (ref 3.5–5.3)
POTASSIUM SERPL-SCNC: 3.6 MMOL/L — SIGNIFICANT CHANGE UP (ref 3.5–5.3)
PROT SERPL-MCNC: 6.8 G/DL — SIGNIFICANT CHANGE UP (ref 6.6–8.7)
RBC # BLD: 3.4 M/UL — LOW (ref 3.8–5.2)
RBC # FLD: 13.7 % — SIGNIFICANT CHANGE UP (ref 10.3–14.5)
SODIUM SERPL-SCNC: 139 MMOL/L — SIGNIFICANT CHANGE UP (ref 135–145)
SPECIMEN SOURCE: SIGNIFICANT CHANGE UP
WBC # BLD: 12.85 K/UL — HIGH (ref 3.8–10.5)
WBC # FLD AUTO: 12.85 K/UL — HIGH (ref 3.8–10.5)

## 2023-11-29 PROCEDURE — 99233 SBSQ HOSP IP/OBS HIGH 50: CPT

## 2023-11-29 PROCEDURE — 99232 SBSQ HOSP IP/OBS MODERATE 35: CPT

## 2023-11-29 RX ORDER — PIPERACILLIN AND TAZOBACTAM 4; .5 G/20ML; G/20ML
3.38 INJECTION, POWDER, LYOPHILIZED, FOR SOLUTION INTRAVENOUS EVERY 8 HOURS
Refills: 0 | Status: DISCONTINUED | OUTPATIENT
Start: 2023-11-29 | End: 2023-12-04

## 2023-11-29 RX ORDER — PIPERACILLIN AND TAZOBACTAM 4; .5 G/20ML; G/20ML
3.38 INJECTION, POWDER, LYOPHILIZED, FOR SOLUTION INTRAVENOUS ONCE
Refills: 0 | Status: COMPLETED | OUTPATIENT
Start: 2023-11-29 | End: 2023-11-29

## 2023-11-29 RX ORDER — ACETAMINOPHEN 500 MG
1000 TABLET ORAL ONCE
Refills: 0 | Status: COMPLETED | OUTPATIENT
Start: 2023-11-29 | End: 2023-11-29

## 2023-11-29 RX ORDER — PIPERACILLIN AND TAZOBACTAM 4; .5 G/20ML; G/20ML
3.38 INJECTION, POWDER, LYOPHILIZED, FOR SOLUTION INTRAVENOUS EVERY 6 HOURS
Refills: 0 | Status: DISCONTINUED | OUTPATIENT
Start: 2023-11-29 | End: 2023-11-29

## 2023-11-29 RX ORDER — PIPERACILLIN AND TAZOBACTAM 4; .5 G/20ML; G/20ML
3.38 INJECTION, POWDER, LYOPHILIZED, FOR SOLUTION INTRAVENOUS ONCE
Refills: 0 | Status: DISCONTINUED | OUTPATIENT
Start: 2023-11-29 | End: 2023-11-29

## 2023-11-29 RX ADMIN — Medication 400 MILLIGRAM(S): at 05:00

## 2023-11-29 RX ADMIN — PIPERACILLIN AND TAZOBACTAM 25 GRAM(S): 4; .5 INJECTION, POWDER, LYOPHILIZED, FOR SOLUTION INTRAVENOUS at 11:46

## 2023-11-29 RX ADMIN — CEFTRIAXONE 2000 MILLIGRAM(S): 500 INJECTION, POWDER, FOR SOLUTION INTRAMUSCULAR; INTRAVENOUS at 01:21

## 2023-11-29 RX ADMIN — PIPERACILLIN AND TAZOBACTAM 200 GRAM(S): 4; .5 INJECTION, POWDER, LYOPHILIZED, FOR SOLUTION INTRAVENOUS at 08:28

## 2023-11-29 RX ADMIN — PIPERACILLIN AND TAZOBACTAM 25 GRAM(S): 4; .5 INJECTION, POWDER, LYOPHILIZED, FOR SOLUTION INTRAVENOUS at 21:53

## 2023-11-29 NOTE — PROGRESS NOTE ADULT - ASSESSMENT
68 F hx schizoaffective disorder, bipolar type sent from [facility] for fever of 101.6.  Hx obtained from chart review as pt sedated from receiving meds by ED and not providing any meaningful information at this time.  Pt with fever as above and was also noted to be tachycardic.  Pt refused to take BP at facility and per documentation as a tendency to not comply with vital sign checks and testing. It is unclear if pt reported any symptoms to staff,  Upon arrival to ED, pt refused vital signs and testing and had to be sedated with multiple agents.  Labs notable for leukocytosis and abnormal UA. Found to have klebsiella bacteremia. UA (-) unclear if she received antibiotics prior to arrival. Labs with leukocytosis and mildly elevated LFT, ? passed gallstone    - f/u BCX 11/26 klebsiella pneumoniae (S) to zosyn  - f/u repeat BCX 11/28  - f/u UCX ngtd  - agree with CT a/p  - c/w zosyn  - Trend Fever  - Trend Leukocytosis  - Trend LFT    d/w Dr Chung  Will Follow 68 F hx schizoaffective disorder, bipolar type sent from [facility] for fever of 101.6.  Hx obtained from chart review as pt sedated from receiving meds by ED and not providing any meaningful information at this time.  Pt with fever as above and was also noted to be tachycardic.  Pt refused to take BP at facility and per documentation as a tendency to not comply with vital sign checks and testing. It is unclear if pt reported any symptoms to staff,  Upon arrival to ED, pt refused vital signs and testing and had to be sedated with multiple agents.  Labs notable for leukocytosis and abnormal UA. Found to have klebsiella bacteremia. UA (-) unclear if she received antibiotics prior to arrival. Labs with leukocytosis and mildly elevated LFT, ? passed gallstone    - f/u BCX 11/26 klebsiella pneumoniae (S) to zosyn  - f/u repeat BCX 11/28  - f/u UCX ngtd  - agree with CT a/p  - c/w zosyn  - Trend Fever  - Trend Leukocytosis  - Trend LFT    d/w Dr Chung  Will Follow 68 F hx schizoaffective disorder, bipolar type sent from [facility] for fever of 101.6.  Hx obtained from chart review as pt sedated from receiving meds by ED and not providing any meaningful information at this time.  Pt with fever as above and was also noted to be tachycardic.  Pt refused to take BP at facility and per documentation as a tendency to not comply with vital sign checks and testing. It is unclear if pt reported any symptoms to staff,  Upon arrival to ED, pt refused vital signs and testing and had to be sedated with multiple agents.  Labs notable for leukocytosis and abnormal UA. Found to have klebsiella bacteremia. UA (-) unclear if she received antibiotics prior to arrival. Labs with leukocytosis and mildly elevated LFT, ? passed gallstone    - f/u BCX 11/26 klebsiella pneumoniae (S) to zosyn  - f/u repeat BCX 11/28  - f/u UCX ngtd  - agree with CT a/p  - c/w zosyn  - Trend Fever  - Trend Leukocytosis  - Trend LFT    d/w Dr Chung  Will Follow

## 2023-11-29 NOTE — PROGRESS NOTE ADULT - SUBJECTIVE AND OBJECTIVE BOX
Patient states "I feel okay" and currently denies any pain. Patient asking for wrist restraints to be removed, denied pulling out her IV saying "No I didn't! Take these restraints off me!"     MEDICATIONS  (STANDING):  acetaminophen  Suppository .. 650 milliGRAM(s) Rectal every 6 hours  enoxaparin Injectable 40 milliGRAM(s) SubCutaneous every 24 hours  piperacillin/tazobactam IVPB.- 3.375 Gram(s) IV Intermittent once  piperacillin/tazobactam IVPB.. 3.375 Gram(s) IV Intermittent every 8 hours    MEDICATIONS  (PRN):  haloperidol    Injectable 5 milliGRAM(s) IV Push every 6 hours PRN Agitation  LORazepam   Injectable 1 milliGRAM(s) IntraMuscular every 6 hours PRN Agitation  ondansetron Injectable 4 milliGRAM(s) IV Push every 6 hours PRN Nausea and/or Vomiting    Vital Signs Last 24 Hrs  T(C): 38.5 (29 Nov 2023 04:28), Max: 39.3 (28 Nov 2023 14:22)  T(F): 101.3 (29 Nov 2023 04:28), Max: 102.8 (28 Nov 2023 14:22)  HR: 109 (29 Nov 2023 04:28) (76 - 109)  BP: 145/81 (29 Nov 2023 04:28) (145/81 - 161/80)  BP(mean): --  RR: 18 (29 Nov 2023 04:28) (18 - 18)  SpO2: 91% (29 Nov 2023 04:28) (91% - 93%)    Parameters below as of 29 Nov 2023 04:28  Patient On (Oxygen Delivery Method): room air       LABS:             10.3   12.85 )-----------( 267      ( 29 Nov 2023 04:50 )             31.4     11-29    139  |  101  |  16.1  ----------------------------<  99  3.6   |  24.0  |  0.66    Ca    8.8      29 Nov 2023 04:50    TPro  6.8  /  Alb  2.8<L>  /  TBili  0.5  /  DBili  x   /  AST  38<H>  /  ALT  43<H>  /  AlkPhos  153<H>  11-29

## 2023-11-29 NOTE — PROGRESS NOTE ADULT - PROBLEM SELECTOR PLAN 1
Continue PRN medications Ativan and Haldol  Wrist restraints needed because patient was pulling out IV lines

## 2023-11-29 NOTE — PROVIDER CONTACT NOTE (CRITICAL VALUE NOTIFICATION) - TEST AND RESULT REPORTED:
Blood culture 11/26 - growth in anaerobic bottle with klebsiella pneumoniae; growth in aerobic bottle with gram negative rods.
+ blood culture - gram - rods in anaerobic bottle
+ blood cultures - gram - rods in aerobic bottle

## 2023-11-29 NOTE — PROVIDER CONTACT NOTE (CRITICAL VALUE NOTIFICATION) - PERSON GIVING RESULT:
Mil Zayas - [facility]  Mil aZyas - [facility]  Mil Zayas - [facility]

## 2023-11-29 NOTE — PROGRESS NOTE ADULT - SUBJECTIVE AND OBJECTIVE BOX
SUBJECTIVE / OVERNIGHT EVENTS: Seen and examined. complains of wrist restraints though was pulling IV line.    MEDICATIONS  (STANDING):  acetaminophen  Suppository .. 650 milliGRAM(s) Rectal every 6 hours  enoxaparin Injectable 40 milliGRAM(s) SubCutaneous every 24 hours  piperacillin/tazobactam IVPB.. 3.375 Gram(s) IV Intermittent every 8 hours    MEDICATIONS  (PRN):  haloperidol    Injectable 5 milliGRAM(s) IV Push every 6 hours PRN Agitation  LORazepam   Injectable 1 milliGRAM(s) IntraMuscular every 6 hours PRN Agitation  ondansetron Injectable 4 milliGRAM(s) IV Push every 6 hours PRN Nausea and/or Vomiting        I&O's Summary      PHYSICAL EXAM:  Vital Signs Last 24 Hrs  T(C): 37 (29 Nov 2023 10:15), Max: 39.3 (28 Nov 2023 14:22)  T(F): 98.6 (29 Nov 2023 10:15), Max: 102.8 (28 Nov 2023 14:22)  HR: 99 (29 Nov 2023 10:15) (76 - 109)  BP: 135/84 (29 Nov 2023 10:15) (135/84 - 161/80)  BP(mean): --  RR: 18 (29 Nov 2023 10:15) (18 - 18)  SpO2: 92% (29 Nov 2023 10:15) (91% - 93%)    Parameters below as of 29 Nov 2023 10:15  Patient On (Oxygen Delivery Method): room air          GENERAL: relatively calm, intermittently agitated   HEAD:  Atraumatic, Normocephalic  NECK: Supple, No JVD  CHEST/LUNG: no increased WOB  HEART: RRR, no LE edema  ABDOMEN: +BS, NTTP  NERVOUS SYSTEM:  Unable to assess, moving all extremities     LABS:                        10.3   12.85 )-----------( 267      ( 29 Nov 2023 04:50 )             31.4     11-29    139  |  101  |  16.1  ----------------------------<  99  3.6   |  24.0  |  0.66    Ca    8.8      29 Nov 2023 04:50    TPro  6.8  /  Alb  2.8<L>  /  TBili  0.5  /  DBili  x   /  AST  38<H>  /  ALT  43<H>  /  AlkPhos  153<H>  11-29          Urinalysis Basic - ( 29 Nov 2023 04:50 )    Color: x / Appearance: x / SG: x / pH: x  Gluc: 99 mg/dL / Ketone: x  / Bili: x / Urobili: x   Blood: x / Protein: x / Nitrite: x   Leuk Esterase: x / RBC: x / WBC x   Sq Epi: x / Non Sq Epi: x / Bacteria: x        Culture - Blood (collected 26 Nov 2023 22:52)  Source: .Blood Blood-Peripheral  Gram Stain (27 Nov 2023 21:57):    Growth in aerobic bottle: Gram Negative Rods  Preliminary Report (28 Nov 2023 19:40):    Growth in aerobic bottle: Klebsiella pneumoniae    See previous culture [id number]    Culture - Urine (collected 26 Nov 2023 22:47)  Source: Clean Catch Clean Catch (Midstream)  Final Report (27 Nov 2023 22:38):    No growth    Culture - Blood (collected 26 Nov 2023 22:47)  Source: .Blood Blood-Peripheral  Gram Stain (29 Nov 2023 07:19):    Growth in anaerobic bottle: Gram Negative Rods    Growth in aerobic bottle: Gram Negative Rods  Preliminary Report (29 Nov 2023 07:19):    Growth in anaerobic bottle: Klebsiella pneumoniae    Growth in aerobic bottle: Gram Negative Rods    Direct identification is available within approximately 3-5    hours either by Blood Panel Multiplexed PCR or Direct    MALDI-TOF. Details: [email or web address]  Organism: Blood Culture PCR  Klebsiella pneumoniae (29 Nov 2023 09:54)  Organism: Klebsiella pneumoniae (29 Nov 2023 09:54)  Organism: Blood Culture PCR (27 Nov 2023 22:08)      CAPILLARY BLOOD GLUCOSE            RADIOLOGY & ADDITIONAL TESTS:  Results Reviewed:   Imaging Personally Reviewed:  Electrocardiogram Personally Reviewed:         SUBJECTIVE / OVERNIGHT EVENTS: Seen and examined. complains of wrist restraints though was pulling IV line.    MEDICATIONS  (STANDING):  acetaminophen  Suppository .. 650 milliGRAM(s) Rectal every 6 hours  enoxaparin Injectable 40 milliGRAM(s) SubCutaneous every 24 hours  piperacillin/tazobactam IVPB.. 3.375 Gram(s) IV Intermittent every 8 hours    MEDICATIONS  (PRN):  haloperidol    Injectable 5 milliGRAM(s) IV Push every 6 hours PRN Agitation  LORazepam   Injectable 1 milliGRAM(s) IntraMuscular every 6 hours PRN Agitation  ondansetron Injectable 4 milliGRAM(s) IV Push every 6 hours PRN Nausea and/or Vomiting        I&O's Summary      PHYSICAL EXAM:  Vital Signs Last 24 Hrs  T(C): 37 (29 Nov 2023 10:15), Max: 39.3 (28 Nov 2023 14:22)  T(F): 98.6 (29 Nov 2023 10:15), Max: 102.8 (28 Nov 2023 14:22)  HR: 99 (29 Nov 2023 10:15) (76 - 109)  BP: 135/84 (29 Nov 2023 10:15) (135/84 - 161/80)  BP(mean): --  RR: 18 (29 Nov 2023 10:15) (18 - 18)  SpO2: 92% (29 Nov 2023 10:15) (91% - 93%)    Parameters below as of 29 Nov 2023 10:15  Patient On (Oxygen Delivery Method): room air          GENERAL: relatively calm, intermittently agitated   HEAD:  Atraumatic, Normocephalic  NECK: Supple, No JVD  CHEST/LUNG: no increased WOB  HEART: RRR, no LE edema  ABDOMEN: +BS, NTTP  NERVOUS SYSTEM:  Unable to assess, moving all extremities     LABS:                        10.3   12.85 )-----------( 267      ( 29 Nov 2023 04:50 )             31.4     11-29    139  |  101  |  16.1  ----------------------------<  99  3.6   |  24.0  |  0.66    Ca    8.8      29 Nov 2023 04:50    TPro  6.8  /  Alb  2.8<L>  /  TBili  0.5  /  DBili  x   /  AST  38<H>  /  ALT  43<H>  /  AlkPhos  153<H>  11-29          Urinalysis Basic - ( 29 Nov 2023 04:50 )    Color: x / Appearance: x / SG: x / pH: x  Gluc: 99 mg/dL / Ketone: x  / Bili: x / Urobili: x   Blood: x / Protein: x / Nitrite: x   Leuk Esterase: x / RBC: x / WBC x   Sq Epi: x / Non Sq Epi: x / Bacteria: x        Culture - Blood (collected 26 Nov 2023 22:52)  Source: .Blood Blood-Peripheral  Gram Stain (27 Nov 2023 21:57):    Growth in aerobic bottle: Gram Negative Rods  Preliminary Report (28 Nov 2023 19:40):    Growth in aerobic bottle: Klebsiella pneumoniae    See previous culture [id number]    Culture - Urine (collected 26 Nov 2023 22:47)  Source: Clean Catch Clean Catch (Midstream)  Final Report (27 Nov 2023 22:38):    No growth    Culture - Blood (collected 26 Nov 2023 22:47)  Source: .Blood Blood-Peripheral  Gram Stain (29 Nov 2023 07:19):    Growth in anaerobic bottle: Gram Negative Rods    Growth in aerobic bottle: Gram Negative Rods  Preliminary Report (29 Nov 2023 07:19):    Growth in anaerobic bottle: Klebsiella pneumoniae    Growth in aerobic bottle: Gram Negative Rods    Direct identification is available within approximately 3-5    hours either by Blood Panel Multiplexed PCR or Direct    MALDI-TOF. Details: [email or web address]  Organism: Blood Culture PCR  Klebsiella pneumoniae (29 Nov 2023 09:54)  Organism: Klebsiella pneumoniae (29 Nov 2023 09:54)  Organism: Blood Culture PCR (27 Nov 2023 22:08)      CAPILLARY BLOOD GLUCOSE            RADIOLOGY & ADDITIONAL TESTS:  Results Reviewed:   Imaging Personally Reviewed:  Electrocardiogram Personally Reviewed:         SUBJECTIVE / OVERNIGHT EVENTS: Seen and examined. complains of wrist restraints though was pulling IV line.    MEDICATIONS  (STANDING):  acetaminophen  Suppository .. 650 milliGRAM(s) Rectal every 6 hours  enoxaparin Injectable 40 milliGRAM(s) SubCutaneous every 24 hours  piperacillin/tazobactam IVPB.. 3.375 Gram(s) IV Intermittent every 8 hours    MEDICATIONS  (PRN):  haloperidol    Injectable 5 milliGRAM(s) IV Push every 6 hours PRN Agitation  LORazepam   Injectable 1 milliGRAM(s) IntraMuscular every 6 hours PRN Agitation  ondansetron Injectable 4 milliGRAM(s) IV Push every 6 hours PRN Nausea and/or Vomiting        I&O's Summary      PHYSICAL EXAM:  Vital Signs Last 24 Hrs  T(C): 37 (29 Nov 2023 10:15), Max: 39.3 (28 Nov 2023 14:22)  T(F): 98.6 (29 Nov 2023 10:15), Max: 102.8 (28 Nov 2023 14:22)  HR: 99 (29 Nov 2023 10:15) (76 - 109)  BP: 135/84 (29 Nov 2023 10:15) (135/84 - 161/80)  BP(mean): --  RR: 18 (29 Nov 2023 10:15) (18 - 18)  SpO2: 92% (29 Nov 2023 10:15) (91% - 93%)    Parameters below as of 29 Nov 2023 10:15  Patient On (Oxygen Delivery Method): room air          GENERAL: relatively calm, intermittently agitated   HEAD:  Atraumatic, Normocephalic  NECK: Supple, No JVD  CHEST/LUNG: no increased WOB  HEART: RRR, no LE edema  ABDOMEN: +BS, NTTP  NERVOUS SYSTEM:  Unable to assess, moving all extremities     LABS:                        10.3   12.85 )-----------( 267      ( 29 Nov 2023 04:50 )             31.4     11-29    139  |  101  |  16.1  ----------------------------<  99  3.6   |  24.0  |  0.66    Ca    8.8      29 Nov 2023 04:50    TPro  6.8  /  Alb  2.8<L>  /  TBili  0.5  /  DBili  x   /  AST  38<H>  /  ALT  43<H>  /  AlkPhos  153<H>  11-29          Urinalysis Basic - ( 29 Nov 2023 04:50 )    Color: x / Appearance: x / SG: x / pH: x  Gluc: 99 mg/dL / Ketone: x  / Bili: x / Urobili: x   Blood: x / Protein: x / Nitrite: x   Leuk Esterase: x / RBC: x / WBC x   Sq Epi: x / Non Sq Epi: x / Bacteria: x        Culture - Blood (collected 26 Nov 2023 22:52)  Source: .Blood Blood-Peripheral  Gram Stain (27 Nov 2023 21:57):    Growth in aerobic bottle: Gram Negative Rods  Preliminary Report (28 Nov 2023 19:40):    Growth in aerobic bottle: Klebsiella pneumoniae    See previous culture [id number]    Culture - Urine (collected 26 Nov 2023 22:47)  Source: Clean Catch Clean Catch (Midstream)  Final Report (27 Nov 2023 22:38):    No growth    Culture - Blood (collected 26 Nov 2023 22:47)  Source: .Blood Blood-Peripheral  Gram Stain (29 Nov 2023 07:19):    Growth in anaerobic bottle: Gram Negative Rods    Growth in aerobic bottle: Gram Negative Rods  Preliminary Report (29 Nov 2023 07:19):    Growth in anaerobic bottle: Klebsiella pneumoniae    Growth in aerobic bottle: Gram Negative Rods    Direct identification is available within approximately 3-5    hours either by Blood Panel Multiplexed PCR or Direct    MALDI-TOF. Details: [email or web address]  Organism: Blood Culture PCR  Klebsiella pneumoniae (29 Nov 2023 09:54)  Organism: Klebsiella pneumoniae (29 Nov 2023 09:54)  Organism: Blood Culture PCR (27 Nov 2023 22:08)      CAPILLARY BLOOD GLUCOSE            RADIOLOGY & ADDITIONAL TESTS:  Results Reviewed:   Imaging Personally Reviewed:  Electrocardiogram Personally Reviewed:

## 2023-11-29 NOTE — PROGRESS NOTE ADULT - ASSESSMENT
68yoF hx schizoaffective disorder, bipolar type sent from [facility] for fever of 101.6, on admission found to have leukocytosis and abnormal UA    GNR bacteremia  -c/w CTX for now  -repeat BCx to clear  -UCx negative  -check CT A/P w/ con  -ID consult, recs     #Hx schizoaffective disorder, bipolar type with behavioral disturbance  -Received IM haldol, IM versed and IM Benadryl  by ED for agitation   -Somewhat sedated, but beginning to wake up and speak  -RN to perform bedside dysphagia screen, please follow up, will keep NPO in meantime  -On long-acting haldol injections and haldol solution per transfer meds  -Will order IV haldol 2mg PRN agitation for now  -Added PRN Ativan per psych  -psych recs kenzie    Prophylactic measure -Lovenox 40mg daily   68yoF hx schizoaffective disorder, bipolar type sent from [facility] for fever of 101.6, on admission found to have leukocytosis and abnormal UA    GNR bacteremia  -c/w CTX for now  -repeat BCx to clear  -UCx negative  -check CT A/P w/ con  -ID consult, recs     #Hx schizoaffective disorder, bipolar type with behavioral disturbance  -Received IM haldol, IM versed and IM Benadryl  by ED for agitation   -Somewhat sedated, but beginning to wake up and speak  -RN to perform bedside dysphagia screen, please follow up, will keep NPO in meantime  -On long-acting haldol injections and haldol solution per transfer meds  -Will order IV haldol 2mg PRN agitation for now  -Added PRN Ativan per psych  -psych recs kenzie    Prophylactic measure -Lovenox 40mg daily   68yoF hx schizoaffective disorder, bipolar type sent from [facility] for fever of 101.6, on admission found to have leukocytosis and abnormal UA    GNR bacteremia  -c/w CTX for now  -repeat BCx to clear  -UCx negative  -check CT A/P w/ con  -ID consult, recs     #Hx schizoaffective disorder, bipolar type with behavioral disturbance  -Received IM haldol, IM versed and IM Benadryl  by ED for agitation   -Somewhat sedated, but beginning to wake up and speak  -RN to perform bedside dysphagia screen, please follow up, will keep NPO in meantime  -On long-acting haldol injections and haldol solution per transfer meds  -Will order IV haldol 2mg PRN agitation for now  -Added PRN Ativan per psych  -psych recs kenzie    Prophylactic measure -Lovenox 40mg daily   68yoF hx schizoaffective disorder, bipolar type sent from [facility] facility for fever of 101.6, on admission found to have leukocytosis and abnormal UA.    GNR bacteremia  -c/w CTX for now  -repeat BCx to clear  -UCx negative  -check CT A/P w/ con  -Spoke with ID, continue Zosyn    #Hx schizoaffective disorder, bipolar type with behavioral disturbance  -On long-acting haldol injections and haldol solution per transfer meds  -C/w IV haldol 2mg PRN agitation for now  -PRN Ativan per psych  -psych recs kenzie    Prophylactic measure -Lovenox 40mg daily    Dispo: Acute, likely back to [place] after Abx course   68yoF hx schizoaffective disorder, bipolar type sent from [facility] facility for fever of 101.6, on admission found to have leukocytosis and abnormal UA.    GNR bacteremia  -c/w CTX for now  -repeat BCx to clear  -UCx negative  -check CT A/P w/ con  -Spoke with ID, continue Zosyn    #Hx schizoaffective disorder, bipolar type with behavioral disturbance  -On long-acting haldol injections and haldol solution per transfer meds  -C/w IV haldol 2mg PRN agitation for now  -PRN Ativan per psych  -psych recs kenzie    Prophylactic measure -Lovenox 40mg daily    Dispo: Acute, likely back to [place] after Abx course   68yoF hx schizoaffective disorder, bipolar type sent from [facility] facility for fever of 101.6, on admission found to have leukocytosis and abnormal UA.    GNR bacteremia  -c/w CTX for now  -repeat BCx to clear  -UCx negative  -check CT A/P w/ con  -Spoke with ID, continue Zosyn    #Hx schizoaffective disorder, bipolar type with behavioral disturbance  -On long-acting haldol injections and haldol solution per transfer meds  -C/w IV haldol 2mg PRN agitation for now  -PRN Ativan per psych  -psych recs kenzie    Prophylactic measure -Lovenox 40mg daily    Dispo: Acute, likely back to [place] after Abx course

## 2023-11-29 NOTE — PROGRESS NOTE ADULT - ASSESSMENT
Mental Status Exam:  The patient appears stated age, poor hygiene.  The patient was calm, cooperative with the interview and maintained poor eye contact. No psychomotor agitation or retardation noted.  Gait not observed.  The patient's speech was fluent, normal in tone, rate, and volume.  The patient's mood is "okay."  Affect is constricted, stable and appropriate.  The patient's thoughts are disorganized.  Delusions present. Denies any suicidal or homicidal ideation, intent, or plan.  Insight is poor.  Judgment is impaired.  Impulse control has been poor.

## 2023-11-29 NOTE — PROGRESS NOTE ADULT - SUBJECTIVE AND OBJECTIVE BOX
[facility]                                                INFECTIOUS DISEASES  =======================================================                   Lloyd Olea#   Armani Reed MD#    Ismael Bolaños MD*                           Suzan Gallardo MD*   Deysi Rodriguez MD*           Diplomates American Board of Internal Medicine & Infectious Diseases                  # [place] Office - Appt - Tel  [phone] Fax [phone]                * [place] Office - Appt - Tel [phone] Fax [phone]                                  Hospital Consult line:  [phone]  =======================================================      [facility]-[id number]  SARAH BUTTERFIELD   follow up for: bacteremia  pt calm laying in bed with b/l restraints  denies complaints  demanding "release me"  patient seen and examined.       I have personally reviewed the labs and data; pertinent labs and data are listed in this note; please see below.   ===================================================  REVIEW OF SYSTEMS:  CONSTITUTIONAL:  No Fever or chills  HEENT:  No diplopia or blurred vision.  No earache, sore throat or runny nose.  CARDIOVASCULAR:  No pressure, squeezing, strangling, tightness, heaviness or aching about the chest, neck, axilla or epigastrium.  RESPIRATORY:  No cough, shortness of breath  GASTROINTESTINAL:  No nausea, vomiting or diarrhea.  GENITOURINARY:  No dysuria, frequency or urgency. No Blood in urine  MUSCULOSKELETAL:  no joint aches, no muscle pain  SKIN:  No change in skin, hair or nails.  NEUROLOGIC:  No Headaches, seizures or weakness.  PSYCHIATRIC:  No disorder of thought or mood.  ENDOCRINE:  No heat or cold intolerance  HEMATOLOGICAL:  No easy bruising or bleeding.    =======================================================  Allergies    No Known Allergies    Intolerances    Antibiotics:  piperacillin/tazobactam IVPB.. 3.375 Gram(s) IV Intermittent every 8 hours    Other medications:  acetaminophen  Suppository .. 650 milliGRAM(s) Rectal every 6 hours  enoxaparin Injectable 40 milliGRAM(s) SubCutaneous every 24 hours    ======================================================  Physical Exam:  ============  T(F): 98.7 (29 Nov 2023 17:38), Max: 101.3 (29 Nov 2023 04:28)  HR: 86 (29 Nov 2023 17:38)  BP: 145/85 (29 Nov 2023 17:38)  RR: 18 (29 Nov 2023 17:38)  SpO2: 97% (29 Nov 2023 17:38) (91% - 97%)  temp max in last 48H T(F): , Max: 102.8 (11-28-23 @ 14:22)    General:  No acute distress, laying in bed with b/l restraints  Eye: no conjunctival pallor, no scleral icterus  Neck: Supple, No lymphadenopathy.  Respiratory: Lungs are clear to auscultation, Respirations are non-labored.  Cardiovascular: Normal rate, Regular rhythm,  s1+s2  Gastrointestinal: Soft, Non-tender, Non-distended, Normal bowel sounds.  Integumentary: No rash.  Neurologic: Alert, calm and cooperative, became agitated after repeatedly demanding restraints to be removed  =======================================================  Labs:                        10.3   12.85 )-----------( 267      ( 29 Nov 2023 04:50 )             31.4     11-29    139  |  101  |  16.1  ----------------------------<  99  3.6   |  24.0  |  0.66    Ca    8.8      29 Nov 2023 04:50    TPro  6.8  /  Alb  2.8<L>  /  TBili  0.5  /  DBili  x   /  AST  38<H>  /  ALT  43<H>  /  AlkPhos  153<H>  11-29      Culture - Blood (collected 11-28-23 @ 09:10)  Source: .Blood Blood-Peripheral    Culture - Blood (collected 11-28-23 @ 09:00)  Source: .Blood Blood-Venous    Culture - Blood (collected 11-26-23 @ 22:52)  Source: .Blood Blood-Peripheral  Gram Stain (11-27-23 @ 21:57):    Growth in aerobic bottle: Gram Negative Rods  Final Report (11-29-23 @ 16:37):    Growth in aerobic bottle: Klebsiella pneumoniae    See previous culture [id number]    Culture - Urine (collected 11-26-23 @ 22:47)  Source: Clean Catch Clean Catch (Midstream)  Final Report (11-27-23 @ 22:38):    No growth    Culture - Blood (collected 11-26-23 @ 22:47)  Source: .Blood Blood-Peripheral  Gram Stain (11-29-23 @ 07:19):    Growth in anaerobic bottle: Gram Negative Rods    Growth in aerobic bottle: Gram Negative Rods  Organism: Blood Culture PCR  Klebsiella pneumoniae (11-29-23 @ 09:54)  Organism: Klebsiella pneumoniae (11-29-23 @ 09:54)    Sensitivities:      -  Ertapenem: S <=0.5      -  Levofloxacin: S <=0.5      -  Tobramycin: R >8      -  Aztreonam: S <=4      -  Gentamicin: S <=2      -  Cefazolin: S <=2      -  Cefepime: S <=2      -  Piperacillin/Tazobactam: S <=8      -  Ciprofloxacin: R 2      -  Imipenem: S <=1      -  Ceftriaxone: S <=1      -  Ampicillin: R >16 These ampicillin results predict results for amoxicillin      Method Type: CARLINE      -  Meropenem: S <=1      -  Ampicillin/Sulbactam: R >16/8      -  Cefoxitin: S <=8      -  Trimethoprim/Sulfamethoxazole: S 2/38  Organism: Blood Culture PCR (11-27-23 @ 22:08)    Sensitivities:      Method Type: PCR      -  K. pneumoniae group: Detec (K. pneumoniae, K. quasipneumoniae, K. variicola)                                                    [facility]                                                INFECTIOUS DISEASES  =======================================================                   Lloyd Olea#   Armani Reed MD#    Ismael Bolaños MD*                           Suzan Gallardo MD*   Deysi Rodriguez MD*           Diplomates American Board of Internal Medicine & Infectious Diseases                  # [place] Office - Appt - Tel  [phone] Fax [phone]                * [place] Office - Appt - Tel [phone] Fax [phone]                                  Hospital Consult line:  [phone]  =======================================================      [facility]-[id number]  SARAH BUTTERFIELD   follow up for: bacteremia  pt calm laying in bed with b/l restraints  denies complaints  demanding "release me"  patient seen and examined.       I have personally reviewed the labs and data; pertinent labs and data are listed in this note; please see below.   ===================================================  REVIEW OF SYSTEMS:  CONSTITUTIONAL:  No Fever or chills  HEENT:  No diplopia or blurred vision.  No earache, sore throat or runny nose.  CARDIOVASCULAR:  No pressure, squeezing, strangling, tightness, heaviness or aching about the chest, neck, axilla or epigastrium.  RESPIRATORY:  No cough, shortness of breath  GASTROINTESTINAL:  No nausea, vomiting or diarrhea.  GENITOURINARY:  No dysuria, frequency or urgency. No Blood in urine  MUSCULOSKELETAL:  no joint aches, no muscle pain  SKIN:  No change in skin, hair or nails.  NEUROLOGIC:  No Headaches, seizures or weakness.  PSYCHIATRIC:  No disorder of thought or mood.  ENDOCRINE:  No heat or cold intolerance  HEMATOLOGICAL:  No easy bruising or bleeding.    =======================================================  Allergies    No Known Allergies    Intolerances    Antibiotics:  piperacillin/tazobactam IVPB.. 3.375 Gram(s) IV Intermittent every 8 hours    Other medications:  acetaminophen  Suppository .. 650 milliGRAM(s) Rectal every 6 hours  enoxaparin Injectable 40 milliGRAM(s) SubCutaneous every 24 hours    ======================================================  Physical Exam:  ============  T(F): 98.7 (29 Nov 2023 17:38), Max: 101.3 (29 Nov 2023 04:28)  HR: 86 (29 Nov 2023 17:38)  BP: 145/85 (29 Nov 2023 17:38)  RR: 18 (29 Nov 2023 17:38)  SpO2: 97% (29 Nov 2023 17:38) (91% - 97%)  temp max in last 48H T(F): , Max: 102.8 (11-28-23 @ 14:22)    General:  No acute distress, laying in bed with b/l restraints  Eye: no conjunctival pallor, no scleral icterus  Neck: Supple, No lymphadenopathy.  Respiratory: Lungs are clear to auscultation, Respirations are non-labored.  Cardiovascular: Normal rate, Regular rhythm,  s1+s2  Gastrointestinal: Soft, Non-tender, Non-distended, Normal bowel sounds.  Integumentary: No rash.  Neurologic: Alert, calm and cooperative, became agitated after repeatedly demanding restraints to be removed  =======================================================  Labs:                        10.3   12.85 )-----------( 267      ( 29 Nov 2023 04:50 )             31.4     11-29    139  |  101  |  16.1  ----------------------------<  99  3.6   |  24.0  |  0.66    Ca    8.8      29 Nov 2023 04:50    TPro  6.8  /  Alb  2.8<L>  /  TBili  0.5  /  DBili  x   /  AST  38<H>  /  ALT  43<H>  /  AlkPhos  153<H>  11-29      Culture - Blood (collected 11-28-23 @ 09:10)  Source: .Blood Blood-Peripheral    Culture - Blood (collected 11-28-23 @ 09:00)  Source: .Blood Blood-Venous    Culture - Blood (collected 11-26-23 @ 22:52)  Source: .Blood Blood-Peripheral  Gram Stain (11-27-23 @ 21:57):    Growth in aerobic bottle: Gram Negative Rods  Final Report (11-29-23 @ 16:37):    Growth in aerobic bottle: Klebsiella pneumoniae    See previous culture [id number]    Culture - Urine (collected 11-26-23 @ 22:47)  Source: Clean Catch Clean Catch (Midstream)  Final Report (11-27-23 @ 22:38):    No growth    Culture - Blood (collected 11-26-23 @ 22:47)  Source: .Blood Blood-Peripheral  Gram Stain (11-29-23 @ 07:19):    Growth in anaerobic bottle: Gram Negative Rods    Growth in aerobic bottle: Gram Negative Rods  Organism: Blood Culture PCR  Klebsiella pneumoniae (11-29-23 @ 09:54)  Organism: Klebsiella pneumoniae (11-29-23 @ 09:54)    Sensitivities:      -  Ertapenem: S <=0.5      -  Levofloxacin: S <=0.5      -  Tobramycin: R >8      -  Aztreonam: S <=4      -  Gentamicin: S <=2      -  Cefazolin: S <=2      -  Cefepime: S <=2      -  Piperacillin/Tazobactam: S <=8      -  Ciprofloxacin: R 2      -  Imipenem: S <=1      -  Ceftriaxone: S <=1      -  Ampicillin: R >16 These ampicillin results predict results for amoxicillin      Method Type: CARLINE      -  Meropenem: S <=1      -  Ampicillin/Sulbactam: R >16/8      -  Cefoxitin: S <=8      -  Trimethoprim/Sulfamethoxazole: S 2/38  Organism: Blood Culture PCR (11-27-23 @ 22:08)    Sensitivities:      Method Type: PCR      -  K. pneumoniae group: Detec (K. pneumoniae, K. quasipneumoniae, K. variicola)                                                    [facility]                                                INFECTIOUS DISEASES  =======================================================                   Lloyd Olea#   Armani Reed MD#    Ismael Bolaños MD*                           Suzan Gallardo MD*   Deysi Rodriguez MD*           Diplomates American Board of Internal Medicine & Infectious Diseases                  # [place] Office - Appt - Tel  [phone] Fax [phone]                * [place] Office - Appt - Tel [phone] Fax [phone]                                  Hospital Consult line:  [phone]  =======================================================      [facility]-[id number]  SARAH BUTTERFIELD   follow up for: bacteremia  pt calm laying in bed with b/l restraints  denies complaints  demanding "release me"  patient seen and examined.       I have personally reviewed the labs and data; pertinent labs and data are listed in this note; please see below.   ===================================================  REVIEW OF SYSTEMS:  CONSTITUTIONAL:  No Fever or chills  HEENT:  No diplopia or blurred vision.  No earache, sore throat or runny nose.  CARDIOVASCULAR:  No pressure, squeezing, strangling, tightness, heaviness or aching about the chest, neck, axilla or epigastrium.  RESPIRATORY:  No cough, shortness of breath  GASTROINTESTINAL:  No nausea, vomiting or diarrhea.  GENITOURINARY:  No dysuria, frequency or urgency. No Blood in urine  MUSCULOSKELETAL:  no joint aches, no muscle pain  SKIN:  No change in skin, hair or nails.  NEUROLOGIC:  No Headaches, seizures or weakness.  PSYCHIATRIC:  No disorder of thought or mood.  ENDOCRINE:  No heat or cold intolerance  HEMATOLOGICAL:  No easy bruising or bleeding.    =======================================================  Allergies    No Known Allergies    Intolerances    Antibiotics:  piperacillin/tazobactam IVPB.. 3.375 Gram(s) IV Intermittent every 8 hours    Other medications:  acetaminophen  Suppository .. 650 milliGRAM(s) Rectal every 6 hours  enoxaparin Injectable 40 milliGRAM(s) SubCutaneous every 24 hours    ======================================================  Physical Exam:  ============  T(F): 98.7 (29 Nov 2023 17:38), Max: 101.3 (29 Nov 2023 04:28)  HR: 86 (29 Nov 2023 17:38)  BP: 145/85 (29 Nov 2023 17:38)  RR: 18 (29 Nov 2023 17:38)  SpO2: 97% (29 Nov 2023 17:38) (91% - 97%)  temp max in last 48H T(F): , Max: 102.8 (11-28-23 @ 14:22)    General:  No acute distress, laying in bed with b/l restraints  Eye: no conjunctival pallor, no scleral icterus  Neck: Supple, No lymphadenopathy.  Respiratory: Lungs are clear to auscultation, Respirations are non-labored.  Cardiovascular: Normal rate, Regular rhythm,  s1+s2  Gastrointestinal: Soft, Non-tender, Non-distended, Normal bowel sounds.  Integumentary: No rash.  Neurologic: Alert, calm and cooperative, became agitated after repeatedly demanding restraints to be removed  =======================================================  Labs:                        10.3   12.85 )-----------( 267      ( 29 Nov 2023 04:50 )             31.4     11-29    139  |  101  |  16.1  ----------------------------<  99  3.6   |  24.0  |  0.66    Ca    8.8      29 Nov 2023 04:50    TPro  6.8  /  Alb  2.8<L>  /  TBili  0.5  /  DBili  x   /  AST  38<H>  /  ALT  43<H>  /  AlkPhos  153<H>  11-29      Culture - Blood (collected 11-28-23 @ 09:10)  Source: .Blood Blood-Peripheral    Culture - Blood (collected 11-28-23 @ 09:00)  Source: .Blood Blood-Venous    Culture - Blood (collected 11-26-23 @ 22:52)  Source: .Blood Blood-Peripheral  Gram Stain (11-27-23 @ 21:57):    Growth in aerobic bottle: Gram Negative Rods  Final Report (11-29-23 @ 16:37):    Growth in aerobic bottle: Klebsiella pneumoniae    See previous culture [id number]    Culture - Urine (collected 11-26-23 @ 22:47)  Source: Clean Catch Clean Catch (Midstream)  Final Report (11-27-23 @ 22:38):    No growth    Culture - Blood (collected 11-26-23 @ 22:47)  Source: .Blood Blood-Peripheral  Gram Stain (11-29-23 @ 07:19):    Growth in anaerobic bottle: Gram Negative Rods    Growth in aerobic bottle: Gram Negative Rods  Organism: Blood Culture PCR  Klebsiella pneumoniae (11-29-23 @ 09:54)  Organism: Klebsiella pneumoniae (11-29-23 @ 09:54)    Sensitivities:      -  Ertapenem: S <=0.5      -  Levofloxacin: S <=0.5      -  Tobramycin: R >8      -  Aztreonam: S <=4      -  Gentamicin: S <=2      -  Cefazolin: S <=2      -  Cefepime: S <=2      -  Piperacillin/Tazobactam: S <=8      -  Ciprofloxacin: R 2      -  Imipenem: S <=1      -  Ceftriaxone: S <=1      -  Ampicillin: R >16 These ampicillin results predict results for amoxicillin      Method Type: CARLINE      -  Meropenem: S <=1      -  Ampicillin/Sulbactam: R >16/8      -  Cefoxitin: S <=8      -  Trimethoprim/Sulfamethoxazole: S 2/38  Organism: Blood Culture PCR (11-27-23 @ 22:08)    Sensitivities:      Method Type: PCR      -  K. pneumoniae group: Detec (K. pneumoniae, K. quasipneumoniae, K. variicola)

## 2023-11-30 LAB
CULTURE RESULTS: ABNORMAL

## 2023-11-30 PROCEDURE — 99231 SBSQ HOSP IP/OBS SF/LOW 25: CPT

## 2023-11-30 PROCEDURE — 99233 SBSQ HOSP IP/OBS HIGH 50: CPT

## 2023-11-30 RX ADMIN — PIPERACILLIN AND TAZOBACTAM 25 GRAM(S): 4; .5 INJECTION, POWDER, LYOPHILIZED, FOR SOLUTION INTRAVENOUS at 16:16

## 2023-11-30 RX ADMIN — PIPERACILLIN AND TAZOBACTAM 25 GRAM(S): 4; .5 INJECTION, POWDER, LYOPHILIZED, FOR SOLUTION INTRAVENOUS at 05:26

## 2023-11-30 RX ADMIN — PIPERACILLIN AND TAZOBACTAM 25 GRAM(S): 4; .5 INJECTION, POWDER, LYOPHILIZED, FOR SOLUTION INTRAVENOUS at 21:04

## 2023-11-30 NOTE — PROGRESS NOTE ADULT - PROBLEM SELECTOR PLAN 1
Leon Llanes is a 20 Continue PRN medications Ativan and Haldol Continue PRN medications Ativan and Haldol  add haloperidol 2 mg PO BID

## 2023-11-30 NOTE — PROGRESS NOTE ADULT - SUBJECTIVE AND OBJECTIVE BOX
Patient was sitting up comfortably in chair watching mass on television. States she was able to eat breakfast this morning and that she is feeling better. No longer in wrist restraints.       MEDICATIONS  (STANDING):  acetaminophen  Suppository .. 650 milliGRAM(s) Rectal every 6 hours  enoxaparin Injectable 40 milliGRAM(s) SubCutaneous every 24 hours  piperacillin/tazobactam IVPB.. 3.375 Gram(s) IV Intermittent every 8 hours    MEDICATIONS  (PRN):  haloperidol    Injectable 5 milliGRAM(s) IV Push every 6 hours PRN Agitation  LORazepam   Injectable 1 milliGRAM(s) IntraMuscular every 6 hours PRN Agitation  ondansetron Injectable 4 milliGRAM(s) IV Push every 6 hours PRN Nausea and/or Vomiting    Vital Signs Last 24 Hrs  T(C): 37.4 (30 Nov 2023 05:42), Max: 38 (30 Nov 2023 01:30)  T(F): 99.3 (30 Nov 2023 05:42), Max: 100.4 (30 Nov 2023 01:30)  HR: 99 (30 Nov 2023 05:42) (86 - 99)  BP: 155/91 (30 Nov 2023 05:42) (135/84 - 163/91)  BP(mean): --  RR: 18 (30 Nov 2023 05:42) (18 - 18)  SpO2: 93% (30 Nov 2023 05:42) (92% - 97%)    Parameters below as of 30 Nov 2023 05:42  Patient On (Oxygen Delivery Method): room air                          10.3   12.85 )-----------( 267      ( 29 Nov 2023 04:50 )             31.4     11-29    139  |  101  |  16.1  ----------------------------<  99  3.6   |  24.0  |  0.66    Ca    8.8      29 Nov 2023 04:50    TPro  6.8  /  Alb  2.8<L>  /  TBili  0.5  /  DBili  x   /  AST  38<H>  /  ALT  43<H>  /  AlkPhos  153<H>  11-29

## 2023-11-30 NOTE — PROGRESS NOTE ADULT - PROBLEM/PLAN-1
DISPLAY PLAN FREE TEXT
titers do not indicate a need to immunize

## 2023-11-30 NOTE — PROGRESS NOTE ADULT - SUBJECTIVE AND OBJECTIVE BOX
SUBJECTIVE / OVERNIGHT EVENTS: Seen and examined. Much better appearing today. sitting calmly in chair, endorses she feels ok and denies chest pain, SOB, abd pain, N/V, fever, chills, dysuria or any other complaints. All remainder ROS negative. spoke with ethics, attempted to reach out to Sister: Aissatou Wilcox: [phone]. however not able to get in touch. will try later.    MEDICATIONS  (STANDING):  acetaminophen  Suppository .. 650 milliGRAM(s) Rectal every 6 hours  enoxaparin Injectable 40 milliGRAM(s) SubCutaneous every 24 hours  piperacillin/tazobactam IVPB.. 3.375 Gram(s) IV Intermittent every 8 hours    MEDICATIONS  (PRN):  haloperidol    Injectable 5 milliGRAM(s) IV Push every 6 hours PRN Agitation  LORazepam   Injectable 1 milliGRAM(s) IntraMuscular every 6 hours PRN Agitation  ondansetron Injectable 4 milliGRAM(s) IV Push every 6 hours PRN Nausea and/or Vomiting        I&O's Summary    29 Nov 2023 07:01  -  30 Nov 2023 07:00  --------------------------------------------------------  IN: 820 mL / OUT: 0 mL / NET: 820 mL    30 Nov 2023 07:01  -  30 Nov 2023 11:13  --------------------------------------------------------  IN: 320 mL / OUT: 0 mL / NET: 320 mL        PHYSICAL EXAM:  Vital Signs Last 24 Hrs  T(C): 37.4 (30 Nov 2023 05:42), Max: 38 (30 Nov 2023 01:30)  T(F): 99.3 (30 Nov 2023 05:42), Max: 100.4 (30 Nov 2023 01:30)  HR: 99 (30 Nov 2023 05:42) (86 - 99)  BP: 155/91 (30 Nov 2023 05:42) (145/85 - 163/91)  BP(mean): --  RR: 18 (30 Nov 2023 05:42) (18 - 18)  SpO2: 93% (30 Nov 2023 05:42) (93% - 97%)    Parameters below as of 30 Nov 2023 05:42  Patient On (Oxygen Delivery Method): room air        GENERAL: relatively calm, sitting in chair.  HEAD:  Atraumatic, Normocephalic  NECK: Supple, No JVD  CHEST/LUNG: no increased WOB  HEART: RRR, no LE edema  ABDOMEN: +BS, NTTP  NERVOUS SYSTEM:  No focal deficits. moving all extremities     LABS:                        10.3   12.85 )-----------( 267      ( 29 Nov 2023 04:50 )             31.4     11-29    139  |  101  |  16.1  ----------------------------<  99  3.6   |  24.0  |  0.66    Ca    8.8      29 Nov 2023 04:50    TPro  6.8  /  Alb  2.8<L>  /  TBili  0.5  /  DBili  x   /  AST  38<H>  /  ALT  43<H>  /  AlkPhos  153<H>  11-29          Urinalysis Basic - ( 29 Nov 2023 04:50 )    Color: x / Appearance: x / SG: x / pH: x  Gluc: 99 mg/dL / Ketone: x  / Bili: x / Urobili: x   Blood: x / Protein: x / Nitrite: x   Leuk Esterase: x / RBC: x / WBC x   Sq Epi: x / Non Sq Epi: x / Bacteria: x        Culture - Blood (collected 28 Nov 2023 09:10)  Source: .Blood Blood-Peripheral  Preliminary Report (29 Nov 2023 13:02):    No growth at 24 hours    Culture - Blood (collected 28 Nov 2023 09:00)  Source: .Blood Blood-Venous  Preliminary Report (29 Nov 2023 13:02):    No growth at 24 hours      CAPILLARY BLOOD GLUCOSE            RADIOLOGY & ADDITIONAL TESTS:  Results Reviewed:   Imaging Personally Reviewed:  Electrocardiogram Personally Reviewed:         SUBJECTIVE / OVERNIGHT EVENTS: Seen and examined. Much better appearing today. sitting calmly in chair, endorses she feels ok and denies chest pain, SOB, abd pain, N/V, fever, chills, dysuria or any other complaints. All remainder ROS negative. spoke with ethics, attempted to reach out to Sister: Aissatou Wilcox: [phone]. however not able to get in touch. will try later.    MEDICATIONS  (STANDING):  acetaminophen  Suppository .. 650 milliGRAM(s) Rectal every 6 hours  enoxaparin Injectable 40 milliGRAM(s) SubCutaneous every 24 hours  piperacillin/tazobactam IVPB.. 3.375 Gram(s) IV Intermittent every 8 hours    MEDICATIONS  (PRN):  haloperidol    Injectable 5 milliGRAM(s) IV Push every 6 hours PRN Agitation  LORazepam   Injectable 1 milliGRAM(s) IntraMuscular every 6 hours PRN Agitation  ondansetron Injectable 4 milliGRAM(s) IV Push every 6 hours PRN Nausea and/or Vomiting        I&O's Summary    29 Nov 2023 07:01  -  30 Nov 2023 07:00  --------------------------------------------------------  IN: 820 mL / OUT: 0 mL / NET: 820 mL    30 Nov 2023 07:01  -  30 Nov 2023 11:13  --------------------------------------------------------  IN: 320 mL / OUT: 0 mL / NET: 320 mL        PHYSICAL EXAM:  Vital Signs Last 24 Hrs  T(C): 37.4 (30 Nov 2023 05:42), Max: 38 (30 Nov 2023 01:30)  T(F): 99.3 (30 Nov 2023 05:42), Max: 100.4 (30 Nov 2023 01:30)  HR: 99 (30 Nov 2023 05:42) (86 - 99)  BP: 155/91 (30 Nov 2023 05:42) (145/85 - 163/91)  BP(mean): --  RR: 18 (30 Nov 2023 05:42) (18 - 18)  SpO2: 93% (30 Nov 2023 05:42) (93% - 97%)    Parameters below as of 30 Nov 2023 05:42  Patient On (Oxygen Delivery Method): room air        GENERAL: relatively calm, sitting in chair.  HEAD:  Atraumatic, Normocephalic  NECK: Supple, No JVD  CHEST/LUNG: no increased WOB  HEART: RRR, no LE edema  ABDOMEN: +BS, NTTP  NERVOUS SYSTEM:  No focal deficits. moving all extremities     LABS:                        10.3   12.85 )-----------( 267      ( 29 Nov 2023 04:50 )             31.4     11-29    139  |  101  |  16.1  ----------------------------<  99  3.6   |  24.0  |  0.66    Ca    8.8      29 Nov 2023 04:50    TPro  6.8  /  Alb  2.8<L>  /  TBili  0.5  /  DBili  x   /  AST  38<H>  /  ALT  43<H>  /  AlkPhos  153<H>  11-29          Urinalysis Basic - ( 29 Nov 2023 04:50 )    Color: x / Appearance: x / SG: x / pH: x  Gluc: 99 mg/dL / Ketone: x  / Bili: x / Urobili: x   Blood: x / Protein: x / Nitrite: x   Leuk Esterase: x / RBC: x / WBC x   Sq Epi: x / Non Sq Epi: x / Bacteria: x        Culture - Blood (collected 28 Nov 2023 09:10)  Source: .Blood Blood-Peripheral  Preliminary Report (29 Nov 2023 13:02):    No growth at 24 hours    Culture - Blood (collected 28 Nov 2023 09:00)  Source: .Blood Blood-Venous  Preliminary Report (29 Nov 2023 13:02):    No growth at 24 hours      CAPILLARY BLOOD GLUCOSE            RADIOLOGY & ADDITIONAL TESTS:  Results Reviewed:   Imaging Personally Reviewed:  Electrocardiogram Personally Reviewed:         SUBJECTIVE / OVERNIGHT EVENTS: Seen and examined. Much better appearing today. sitting calmly in chair, endorses she feels ok and denies chest pain, SOB, abd pain, N/V, fever, chills, dysuria or any other complaints. All remainder ROS negative. spoke with ethics, attempted to reach out to Sister: Aissatou Wilcox: [phone]. however not able to get in touch. will try later.    MEDICATIONS  (STANDING):  acetaminophen  Suppository .. 650 milliGRAM(s) Rectal every 6 hours  enoxaparin Injectable 40 milliGRAM(s) SubCutaneous every 24 hours  piperacillin/tazobactam IVPB.. 3.375 Gram(s) IV Intermittent every 8 hours    MEDICATIONS  (PRN):  haloperidol    Injectable 5 milliGRAM(s) IV Push every 6 hours PRN Agitation  LORazepam   Injectable 1 milliGRAM(s) IntraMuscular every 6 hours PRN Agitation  ondansetron Injectable 4 milliGRAM(s) IV Push every 6 hours PRN Nausea and/or Vomiting        I&O's Summary    29 Nov 2023 07:01  -  30 Nov 2023 07:00  --------------------------------------------------------  IN: 820 mL / OUT: 0 mL / NET: 820 mL    30 Nov 2023 07:01  -  30 Nov 2023 11:13  --------------------------------------------------------  IN: 320 mL / OUT: 0 mL / NET: 320 mL        PHYSICAL EXAM:  Vital Signs Last 24 Hrs  T(C): 37.4 (30 Nov 2023 05:42), Max: 38 (30 Nov 2023 01:30)  T(F): 99.3 (30 Nov 2023 05:42), Max: 100.4 (30 Nov 2023 01:30)  HR: 99 (30 Nov 2023 05:42) (86 - 99)  BP: 155/91 (30 Nov 2023 05:42) (145/85 - 163/91)  BP(mean): --  RR: 18 (30 Nov 2023 05:42) (18 - 18)  SpO2: 93% (30 Nov 2023 05:42) (93% - 97%)    Parameters below as of 30 Nov 2023 05:42  Patient On (Oxygen Delivery Method): room air        GENERAL: relatively calm, sitting in chair.  HEAD:  Atraumatic, Normocephalic  NECK: Supple, No JVD  CHEST/LUNG: no increased WOB  HEART: RRR, no LE edema  ABDOMEN: +BS, NTTP  NERVOUS SYSTEM:  No focal deficits. moving all extremities     LABS:                        10.3   12.85 )-----------( 267      ( 29 Nov 2023 04:50 )             31.4     11-29    139  |  101  |  16.1  ----------------------------<  99  3.6   |  24.0  |  0.66    Ca    8.8      29 Nov 2023 04:50    TPro  6.8  /  Alb  2.8<L>  /  TBili  0.5  /  DBili  x   /  AST  38<H>  /  ALT  43<H>  /  AlkPhos  153<H>  11-29          Urinalysis Basic - ( 29 Nov 2023 04:50 )    Color: x / Appearance: x / SG: x / pH: x  Gluc: 99 mg/dL / Ketone: x  / Bili: x / Urobili: x   Blood: x / Protein: x / Nitrite: x   Leuk Esterase: x / RBC: x / WBC x   Sq Epi: x / Non Sq Epi: x / Bacteria: x        Culture - Blood (collected 28 Nov 2023 09:10)  Source: .Blood Blood-Peripheral  Preliminary Report (29 Nov 2023 13:02):    No growth at 24 hours    Culture - Blood (collected 28 Nov 2023 09:00)  Source: .Blood Blood-Venous  Preliminary Report (29 Nov 2023 13:02):    No growth at 24 hours      CAPILLARY BLOOD GLUCOSE            RADIOLOGY & ADDITIONAL TESTS:  Results Reviewed:   Imaging Personally Reviewed:  Electrocardiogram Personally Reviewed:

## 2023-11-30 NOTE — PROGRESS NOTE ADULT - ASSESSMENT
68yoF hx schizoaffective disorder, bipolar type sent from [place] facility for fever of 101.6, on admission found to have leukocytosis and abnormal UA.    GNR bacteremia  -c/w CTX for now  -repeat BCx to clear  -UCx negative  -check CT A/P w/ contrast, pending  -ID following  -Continue Zosyn    #Hx schizoaffective disorder, bipolar type with behavioral disturbance  -On long-acting haldol injections and haldol solution per transfer meds  -C/w IV haldol 2mg PRN agitation for now  -PRN Ativan per psych  -psych recs kenzie- clear for pilgrim as calm and off restraints  -Spoke with ethics    Prophylactic measure -Lovenox 40mg daily    Dispo: Acute, likely back to [place] after IV Abx course and CT abdo/pelvis      attempted to reach out to Sister: Aissatou Wilcox: [phone]. however not able to get in touch. will try later. 68yoF hx schizoaffective disorder, bipolar type sent from [place] facility for fever of 101.6, on admission found to have leukocytosis and abnormal UA.    GNR bacteremia  -c/w CTX for now  -repeat BCx to clear  -UCx negative  -check CT A/P w/ contrast, pending  -ID following  -Continue Zosyn    #Hx schizoaffective disorder, bipolar type with behavioral disturbance  -On long-acting haldol injections and haldol solution per transfer meds  -C/w IV haldol 2mg PRN agitation for now  -PRN Ativan per psych  -psych recs kenzie- clear for pilgrim as calm and off restraints  -Spoke with ethics    Prophylactic measure -Lovenox 40mg daily    Dispo: Acute, likely back to [place] after IV Abx course and CT abdo/pelvis      attempted to reach out to Sister: Aissatou Wilcox: [phone]. however not able to get in touch. will try later. 68yoF hx schizoaffective disorder, bipolar type sent from [place] facility for fever of 101.6, on admission found to have leukocytosis and abnormal UA.    GNR bacteremia  -c/w CTX for now  -repeat BCx to clear  -UCx negative  -check CT A/P w/ contrast, pending  -ID following  -Continue Zosyn    #Hx schizoaffective disorder, bipolar type with behavioral disturbance  -On long-acting haldol injections and haldol solution per transfer meds  -C/w IV haldol 2mg PRN agitation for now  -PRN Ativan per psych  -psych recs kenzie- clear for pilgrim as calm and off restraints  -Spoke with ethics    Prophylactic measure -Lovenox 40mg daily    Dispo: Acute, likely back to [place] after IV Abx course and CT abdo/pelvis      attempted to reach out to Sister: Aissatou Wilcox: [phone]. however not able to get in touch. will try later.

## 2023-11-30 NOTE — PROGRESS NOTE ADULT - ASSESSMENT
The patient appears stated age, poor hygiene.  The patient was calm, cooperative with the interview and maintained appropriate eye contact. No psychomotor agitation or retardation noted.  Gait not observed.  The patient's speech was fluent, normal in tone, rate, and volume.  The patient's mood is "good."  Affect is constricted, stable and appropriate.  The patient's thoughts are disorganized.  No delusions or hallucinations present. Denies any suicidal or homicidal ideation, intent, or plan.  Insight is poor.  Judgment is impaired.  Impulse control has been fair on the unit.

## 2023-11-30 NOTE — CHART NOTE - NSCHARTNOTEFT_GEN_A_CORE
Ethics continues to follow. See note 11/28/23.     The patient's sister, Aissatou Wilcox, is her PHL surrogate per the [facility] Family Health Care Decisions Act (2010). The patient's autonomy is safe guarded by utilizing the appropriate surrogate decision maker, her sister Aissatou. Aissatou can make all medical decisions for the patient EXCEPT FOR WITHHOLDING OR WITHDRAWING OF LIFE SUSTAINING TREATMENT, which would have to be reviewed and approved by [employer] ([facility] [employer]).    Ethics and Palliative Care can assist with this.    Sister: Aissatou Wilcox: [phone]    Updated Dr. Chung (Medicine Attending).    Radha Osorio MD  Ethics Attending  [phone] Ethics continues to follow. See note 11/28/23.     The patient's sister, Aissatou Wilcox, is her PHL surrogate per the [facility] Family Health Care Decisions Act (2010). The patient's autonomy is safe guarded by utilizing the appropriate surrogate decision maker, her sister Aissatou. Aissatou can make all medical decisions for the patient EXCEPT FOR WITHHOLDING OR WITHDRAWING OF LIFE SUSTAINING TREATMENT, which would have to be reviewed and approved by [employer] ([facility] [employer] Services).    Ethics and Palliative Care can assist with this.    Sister: Aissatou Wilcox: [phone]    Updated Dr. Chung (Medicine Attending).    Radha Osorio MD  Ethics Attending  [phone] Ethics continues to follow. See note 11/28/23.     The patient's sister, Aissatou Wilcox, is her PHL surrogate per the [facility] Family Health Care Decisions Act (2010). The patient's autonomy is safe guarded by utilizing the appropriate surrogate decision maker, her sister Aissatou. Aissatou can make all medical decisions for the patient EXCEPT FOR WITHHOLDING OR WITHDRAWING OF LIFE SUSTAINING TREATMENT, which would have to be reviewed and approved by [employer] ([facility] [employer]).    Ethics and Palliative Care can assist with this.    Sister: Aissatou Wilcox: [phone]    Updated Dr. Chung (Medicine Attending).    Radha Osorio MD  Ethics Attending  [phone]

## 2023-12-01 PROCEDURE — 99233 SBSQ HOSP IP/OBS HIGH 50: CPT

## 2023-12-01 RX ORDER — HALOPERIDOL DECANOATE 100 MG/ML
5 INJECTION INTRAMUSCULAR EVERY 6 HOURS
Refills: 0 | Status: DISCONTINUED | OUTPATIENT
Start: 2023-12-01 | End: 2023-12-05

## 2023-12-01 RX ADMIN — PIPERACILLIN AND TAZOBACTAM 25 GRAM(S): 4; .5 INJECTION, POWDER, LYOPHILIZED, FOR SOLUTION INTRAVENOUS at 05:09

## 2023-12-01 RX ADMIN — PIPERACILLIN AND TAZOBACTAM 25 GRAM(S): 4; .5 INJECTION, POWDER, LYOPHILIZED, FOR SOLUTION INTRAVENOUS at 13:39

## 2023-12-01 NOTE — PROGRESS NOTE ADULT - ASSESSMENT
68yoF hx schizoaffective disorder, bipolar type sent from [place] facility for fever of 101.6, on admission found to have leukocytosis and abnormal UA.    Sepsis on admission 2/2 bacteremia; UTI ruled out  GNR bacteremia  -c/w CTX for now  -repeat BCx to clear  -UCx negative  -check CT A/P w/ contrast, pending  -ID following  -Continue Zosyn    #Hx schizoaffective disorder, bipolar type with behavioral disturbance  -On long-acting haldol injections and haldol solution per transfer meds  -C/w IV haldol 2mg PRN agitation for now  -PRN Ativan per psych  -psych recs kenzie- clear for pilgrim as calm and off restraints  -Spoke with ethics    Prophylactic measure -Lovenox 40mg daily    [facility]/pelvis      attempted to reach out to Sister: Aissatou Wilcox: [phone]. however not able to get in touch. will try later. 68yoF hx schizoaffective disorder, bipolar type sent from [place] facility for fever of 101.6, on admission found to have leukocytosis and abnormal UA.    Sepsis on admission 2/2 bacteremia; UTI ruled out  GNR bacteremia  -c/w CTX for now  -repeat BCx to clear  -UCx negative  -check CT A/P w/ contrast, pending  -ID following  -Continue Zosyn    #Hx schizoaffective disorder, bipolar type with behavioral disturbance  -On long-acting haldol injections and haldol solution per transfer meds  -C/w IV haldol 2mg PRN agitation for now  -PRN Ativan per psych  -psych recs kenzie- clear for pilgrim as calm and off restraints  -Spoke with ethics    Prophylactic measure -Lovenox 40mg daily    [facility]: [facility] ab[facility]/pelvis      attempted to reach out to Sister: Aissatou Wilcox: [phone]. however not able to get in touch. will try later. 68yoF hx schizoaffective disorder, bipolar type sent from [place] facility for fever of 101.6, on admission found to have leukocytosis and abnormal UA.    Sepsis on admission 2/2 bacteremia; UTI ruled out  GNR bacteremia  -c/w CTX for now  -repeat BCx to clear  -UCx negative  -check CT A/P w/ contrast, pending  -ID following  -Continue Zosyn    #Hx schizoaffective disorder, bipolar type with behavioral disturbance  -On long-acting haldol injections and haldol solution per transfer meds  -C/w IV haldol 2mg PRN agitation for now  -PRN Ativan per psych  -psych recs kenzie- clear for pilgrim as calm and off restraints  -Spoke with ethics    Prophylactic measure -Lovenox 40mg daily    [facility]: [facility]pelvis      attempted to reach out to Sister: Aissatou Wilcox: [phone]. however not able to get in touch. will try later.

## 2023-12-01 NOTE — CHART NOTE - NSCHARTNOTEFT_GEN_A_CORE
Notified by RN pt refusing IVPB Zosyn, vital signs and yelling at staff.  Patient sitting in her chair at bedside, calm, NAD  Pt awake, alert and oriented X1-2, unable to complete assessment due to pt becoming hostile during exam.  Patient yelling at this PA, gets out of chair and standing with clenched fists. Patient refusing to take Zosyn or allow RN to perform VS.   Patient continues to yell and becoming increasingly aggressive. Verbal de-escalation attempted. Pt requests to be left alone.   Patient left calm, sitting in chair at bedside with arms crossed. Will continue to monitor.  PMD will be notified in AM. Notified by RN pt refusing IVPB Zosyn, vital signs and yelling at staff.  Patient sitting in her chair at bedside, calm, NAD  Pt awake, alert and oriented X1-2, unable to complete assessment due to pt becoming hostile during exam.  Pt educated on medical necessity of antibiotics and risks associated with nontreatment including but not limited to deterioration of health, septic shock and death. Pt yells " I don't care. I am not taking it anymore!"  Patient yelling at this PA, gets out of chair and standing with clenched fists. Patient refusing to take Zosyn or allow RN to perform VS.   Patient continues to yell and becoming increasingly aggressive. Verbal de-escalation attempted. Pt requests to be left alone.   Patient left calm, sitting in chair at bedside with arms crossed. Will continue to monitor.  PMD will be notified in AM. Notified by RN pt refusing IVPB Zosyn, vital signs and yelling at staff.  Patient sitting in her chair at bedside, calm, NAD  Pt awake, alert and oriented X1-2? Unable to complete assessment due to pt uncooperative and becoming hostile during exam.  Pt educated on medical necessity of antibiotics and risks associated with nontreatment including but not limited to deterioration of health, septic shock and death. Pt yells " I don't care. I am not taking it anymore!"  Patient yelling at this PA, gets out of chair and standing with clenched fists. Patient refusing to take Zosyn or allow RN to perform VS.   Patient continues to yell and becoming increasingly aggressive. Verbal de-escalation attempted. Pt requests to be left alone.   Patient left calm, sitting in chair at bedside with arms crossed. Will continue to monitor.  PMD will be notified in AM.    12/2/2023 06:15 AM Pt revisited at bedside for AM medications and VS check. Pt refusing medications and VS. Pt yelling at staff and becoming aggressive. Recommend F/U with BH in AM.

## 2023-12-01 NOTE — CDI QUERY NOTE - NSCDIOTHERTXTBX_GEN_ALL_CORE_HH
Sent from [place] for fever and suspected UTI  Found to have Klebsiella bacteremia  Psych documents sepsis and urosepsis  Urine culture neg, but unknown if treated prior to ED    Can you clarify, in progress notes and DC summary, the status of sepsis?   - Sepsis on admission 2/2 to UTI and bacteremia, resolving, resolvede   - Sepsis on admission 2/2 bacteremia; UTI ruled out   - Other    EVIDENCE/DOCUMENTATION:  URINE:  Culture - Urine (11.26.23 @ 22:47)    Specimen Source: Clean Catch Clean Catch (Midstream)    Culture Results:   No growth    BLOOD CULTURE:  Culture - Blood (11.28.23 @ 09:10)    Specimen Source: .Blood Blood-Peripheral    Culture Results:   No growth at 72 Hours    H&P:   sent from [facility] for fever of 101.6.  Hx obtained from chart review as pt sedated from receiving meds by ED and not providing any meaningful information at this time.  Pt with fever as above and was also noted to be tachycardic.  Pt refused to take BP at facility and per documentation as a tendency to not comply with vital sign checks and testing. It is unclear if pt reported any symptoms to staff,  Upon arrival to ED, pt refused vital signs and testing and had to be sedated with multiple agents.  Labs notable for leukocytosis and abnormal UA.      Suspected UTI  -Pt poor historian, unclear if active urinary symptoms  Leukocytosis  -Due to infectious process above, monitor  Hx schizoaffective disorder, bipolar type with behavioral disturbance  -Received IM haldol, IM versed and IM Benadryl  by ED for agitation     BEHAVIORAL HEALTH 11/27 NOTE:  HPI     admitted with UTI - sepsis    Assessment and Plan:  Summary (brief):  68 yr old female from [place] admitted for urosepsis h/o chronic schizoaffective disorder resistive to treatment    ID 11/28 NOTE:  Pt with fever as above and was also noted to be tachycardic.  Pt refused to take BP at facility and per documentation as a tendency to not comply with vital sign checks and testing. It is unclear if pt reported any symptoms to staff,  Upon arrival to ED, pt refused vital signs and testing and had to be sedated with multiple agents.  Labs notable for leukocytosis and abnormal UA. Found to have klebsiella bacteremia. UA (-) unclear if she received antibiotics prior to arrival. Labs with leukocytosis and mildly elevated LFT    ATTENDING 11/29,30 NOTES:   sent from [place] facility for fever of 101.6, on admission found to have leukocytosis and abnormal UA.    GNR bacteremia  -c/w CTX for now  -repeat BCx to clear  -UCx negative  -check CT A/P w/ con  -Spoke with ID, continue Zosyn    Thank you Sent from [place] for fever and suspected UTI  Found to have Klebsiella bacteremia  Psych documents sepsis and urosepsis  Urine culture neg, but unknown if treated prior to ED    Can you clarify, in progress notes and DC summary, the status of sepsis?   - Sepsis on admission 2/2 to UTI and bacteremia, resolving, resolvede   - Sepsis on admission 2/2 bacteremia; UTI ruled out   - Other    EVIDENCE/DOCUMENTATION:  URINE:  Culture - Urine (11.26.23 @ 22:47)    Specimen Source: Clean Catch Clean Catch (Midstream)    Culture Results:   No growth    BLOOD CULTURE:  Culture - Blood (11.28.23 @ 09:10)    Specimen Source: .Blood Blood-Peripheral    Culture Results:   No growth at 72 Hours    H&P:   sent from [facility] for fever of 101.6.  Hx obtained from chart review as pt sedated from receiving meds by ED and not providing any meaningful information at this time.  Pt with fever as above and was also noted to be tachycardic.  Pt refused to take BP at facility and per documentation as a tendency to not comply with vital sign checks and testing. It is unclear if pt reported any symptoms to staff,  Upon arrival to ED, pt refused vital signs and testing and had to be sedated with multiple agents.  Labs notable for leukocytosis and abnormal UA.      Suspected UTI  -Pt poor historian, unclear if active urinary symptoms  Leukocytosis  -Due to infectious process above, monitor  Hx schizoaffective disorder, bipolar type with behavioral disturbance  -Received IM haldol, IM versed and IM Benadryl  by ED for agitation     BEHAVIORAL HEALTH 11/27 NOTE:  HPI     admitted with UTI - sepsis    Assessment and Plan:  Summary (brief):  68 yr old female from [place] admitted for urosepsis h/o chronic schizoaffective disorder resistive to treatment    ID 11/28 NOTE:  Pt with fever as above and was also noted to be tachycardic.  Pt refused to take BP at facility and per documentation as a tendency to not comply with vital sign checks and testing. It is unclear if pt reported any symptoms to staff,  Upon arrival to ED, pt refused vital signs and testing and had to be sedated with multiple agents.  Labs notable for leukocytosis and abnormal UA. Found to have klebsiella bacteremia. UA (-) unclear if she received antibiotics prior to arrival. Labs with leukocytosis and mildly elevated LFT    ATTENDING 11/29,30 NOTES:   sent from [place] facility for fever of 101.6, on admission found to have leukocytosis and abnormal UA.    GNR bacteremia  -c/w CTX for now  -repeat BCx to clear  -UCx negative  -check CT A/P w/ con  -Spoke with ID, continue Zosyn    Thank you Sent from [place] for fever and suspected UTI  Found to have Klebsiella bacteremia  Psych documents sepsis and urosepsis  Urine culture neg, but unknown if treated prior to ED    Can you clarify, in progress notes and DC summary, the status of sepsis?   - Sepsis on admission 2/2 to UTI and bacteremia, resolving, resolvede   - Sepsis on admission 2/2 bacteremia; UTI ruled out   - Other    EVIDENCE/DOCUMENTATION:  URINE:  Culture - Urine (11.26.23 @ 22:47)    Specimen Source: Clean Catch Clean Catch (Midstream)    Culture Results:   No growth    BLOOD CULTURE:  Culture - Blood (11.28.23 @ 09:10)    Specimen Source: .Blood Blood-Peripheral    Culture Results:   No growth at 72 Hours    H&P:   sent from [facility] for fever of 101.6.  Hx obtained from chart review as pt sedated from receiving meds by ED and not providing any meaningful information at this time.  Pt with fever as above and was also noted to be tachycardic.  Pt refused to take BP at facility and per documentation as a tendency to not comply with vital sign checks and testing. It is unclear if pt reported any symptoms to staff,  Upon arrival to ED, pt refused vital signs and testing and had to be sedated with multiple agents.  Labs notable for leukocytosis and abnormal UA.      Suspected UTI  -Pt poor historian, unclear if active urinary symptoms  Leukocytosis  -Due to infectious process above, monitor  Hx schizoaffective disorder, bipolar type with behavioral disturbance  -Received IM haldol, IM versed and IM Benadryl  by ED for agitation     BEHAVIORAL HEALTH 11/27 NOTE:  HPI     admitted with UTI - sepsis    Assessment and Plan:  Summary (brief):  68 yr old female from [place] admitted for urosepsis h/o chronic schizoaffective disorder resistive to treatment    ID 11/28 NOTE:  Pt with fever as above and was also noted to be tachycardic.  Pt refused to take BP at facility and per documentation as a tendency to not comply with vital sign checks and testing. It is unclear if pt reported any symptoms to staff,  Upon arrival to ED, pt refused vital signs and testing and had to be sedated with multiple agents.  Labs notable for leukocytosis and abnormal UA. Found to have klebsiella bacteremia. UA (-) unclear if she received antibiotics prior to arrival. Labs with leukocytosis and mildly elevated LFT    ATTENDING 11/29,30 NOTES:   sent from [facility] facility for fever of 101.6, on admission found to have leukocytosis and abnormal UA.    GNR bacteremia  -c/w CTX for now  -repeat BCx to clear  -UCx negative  -check CT A/P w/ con  -Spoke with ID, continue Zosyn    Thank you

## 2023-12-01 NOTE — BH CONSULTATION LIAISON PROGRESS NOTE - NSBHASSESSMENTFT_PSY_ALL_CORE
Pt seems to be cooperating with care, no new recommendations.  Once patient is medically cleared she can be discharged to [place]   Pt seems to be cooperating with care, no new recommendations.  Once patient is medically cleared she can be discharged to [place]   Pt seems to be cooperating with care, no new recommendations.  Once patient is medically cleared she can be discharged to [place]

## 2023-12-01 NOTE — BH CONSULTATION LIAISON PROGRESS NOTE - NSBHCHARTREVIEWVS_PSY_A_CORE FT
Vital Signs Last 24 Hrs  T(C): --  T(F): --  HR: --  BP: --  BP(mean): --  ABP: --  ABP(mean): --  RR: --  SpO2: --

## 2023-12-01 NOTE — BH CONSULTATION LIAISON PROGRESS NOTE - NSBHFUPMEDSE_PSY_A_CORE
[FreeTextEntry1] : 30 y o P0 female presents for routine annual GYN care. Her last annual GYN visit in this office was in 2016 and she states she has had GYN care at [facility] in 2019 when she was diagnosed with an abnormal pap smear and colposcopy was done. She reports colposcopy was negative and she has not followed up since.\par She has a history of GERD, PCOS, fibromyalgia, bipolar disorder, anxiety, spondylarthropathy and  exercise induced asthma. \par She denies current GYN complaints and reports normal bowel and bladder function.\par She is sexually active with her male fiance and uses withdrawal for contraception although she would like to become pregnant later this year. Discussion was had re preparing for a pregnancy with weight loss, exercise, possible metformin use and discussion with her psychiatrist re impact of her medications on pregnancy.\par She would like to begin contraception
None known

## 2023-12-01 NOTE — BH CONSULTATION LIAISON PROGRESS NOTE - CURRENT MEDICATION
MEDICATIONS  (STANDING):  acetaminophen  Suppository .. 650 milliGRAM(s) Rectal every 6 hours  enoxaparin Injectable 40 milliGRAM(s) SubCutaneous every 24 hours  piperacillin/tazobactam IVPB.. 3.375 Gram(s) IV Intermittent every 8 hours    MEDICATIONS  (PRN):  haloperidol    Injectable 5 milliGRAM(s) IV Push every 6 hours PRN Agitation  LORazepam   Injectable 1 milliGRAM(s) IntraMuscular every 6 hours PRN Agitation  ondansetron Injectable 4 milliGRAM(s) IV Push every 6 hours PRN Nausea and/or Vomiting

## 2023-12-01 NOTE — PROGRESS NOTE ADULT - SUBJECTIVE AND OBJECTIVE BOX
SUBJECTIVE / OVERNIGHT EVENTS: Seen and examined. Refused CT scan yesterday and became agitated. She is currently calm and sitting in chair. Still refusing CT. Will need it as per ID. she denies any complaints.    MEDICATIONS  (STANDING):  acetaminophen  Suppository .. 650 milliGRAM(s) Rectal every 6 hours  enoxaparin Injectable 40 milliGRAM(s) SubCutaneous every 24 hours  piperacillin/tazobactam IVPB.. 3.375 Gram(s) IV Intermittent every 8 hours    MEDICATIONS  (PRN):  haloperidol    Injectable 5 milliGRAM(s) IV Push every 6 hours PRN Agitation  LORazepam   Injectable 1 milliGRAM(s) IntraMuscular every 6 hours PRN Agitation  ondansetron Injectable 4 milliGRAM(s) IV Push every 6 hours PRN Nausea and/or Vomiting        I&O's Summary    30 Nov 2023 07:01  -  01 Dec 2023 07:00  --------------------------------------------------------  IN: 320 mL / OUT: 0 mL / NET: 320 mL        PHYSICAL EXAM:  Vital Signs Last 24 Hrs  T(C): --  T(F): --  HR: --  BP: --  BP(mean): --  RR: --  SpO2: --        GENERAL: relatively calm, sitting in chair.  HEAD:  Atraumatic, Normocephalic  NECK: Supple, No JVD  CHEST/LUNG: no increased WOB  HEART: RRR, no LE edema  ABDOMEN: +BS, NTTP  NERVOUS SYSTEM:  No focal deficits. moving all extremities     LABS:                    CAPILLARY BLOOD GLUCOSE            RADIOLOGY & ADDITIONAL TESTS:  Results Reviewed:   Imaging Personally Reviewed:  Electrocardiogram Personally Reviewed:

## 2023-12-02 PROCEDURE — 99233 SBSQ HOSP IP/OBS HIGH 50: CPT

## 2023-12-02 NOTE — PROGRESS NOTE ADULT - SUBJECTIVE AND OBJECTIVE BOX
Patient is a 68y old  Female who presents with a chief complaint of Suspected UTI (01 Dec 2023 15:11)      Patient seen and examined at bedside. No overnight events reported. comfortable. denies any n/v. tolerating diet     ALLERGIES:  No Known Allergies    MEDICATIONS  (STANDING):  acetaminophen  Suppository .. 650 milliGRAM(s) Rectal every 6 hours  enoxaparin Injectable 40 milliGRAM(s) SubCutaneous every 24 hours  piperacillin/tazobactam IVPB.. 3.375 Gram(s) IV Intermittent every 8 hours    MEDICATIONS  (PRN):  haloperidol    Injectable 5 milliGRAM(s) IntraMuscular every 6 hours PRN Agitation  LORazepam   Injectable 1 milliGRAM(s) IntraMuscular every 6 hours PRN Agitation  ondansetron Injectable 4 milliGRAM(s) IV Push every 6 hours PRN Nausea and/or Vomiting    Vital Signs Last 24 Hrs  T(F): --  HR: --  BP: --  RR: --  SpO2: --  I&O's Summary    PHYSICAL EXAM:  General: NAD, A/O x 2 , not very cooperative   ENT: MMM, no thrush  Neck: Supple, No JVD  Lungs: Clear to auscultation bilaterally, good air entry, non-labored breathing  Cardio: RRR, S1/S2, No murmur  Abdomen: Soft, Nontender, Nondistended; Bowel sounds present  Extremities: No calf tenderness, No pitting edema    LABS:                                                Culture - Blood (collected 28 Nov 2023 09:10)  Source: .Blood Blood-Peripheral  Preliminary Report (02 Dec 2023 13:00):    No growth at 4 days    Culture - Blood (collected 28 Nov 2023 09:00)  Source: .Blood Blood-Venous  Preliminary Report (02 Dec 2023 13:00):    No growth at 4 days    Culture - Blood (collected 26 Nov 2023 22:52)  Source: .Blood Blood-Peripheral  Gram Stain (29 Nov 2023 23:53):    Growth in aerobic bottle: Gram Negative Rods    Growth in anaerobic bottle: Gram Negative Rods  Final Report (30 Nov 2023 18:39):    Growth in aerobic and anaerobic bottles: Klebsiella pneumoniae    See previous culture [id number]    Culture - Urine (collected 26 Nov 2023 22:47)  Source: Clean Catch Clean Catch (Midstream)  Final Report (27 Nov 2023 22:38):    No growth    Culture - Blood (collected 26 Nov 2023 22:47)  Source: .Blood Blood-Peripheral  Gram Stain (29 Nov 2023 07:19):    Growth in anaerobic bottle: Gram Negative Rods    Growth in aerobic bottle: Gram Negative Rods  Final Report (29 Nov 2023 22:49):    Growth in aerobic and anaerobic bottles: Klebsiella pneumoniae    Direct identification is available within approximately 3-5    hours either by Blood Panel Multiplexed PCR or Direct    MALDI-TOF. Details: [email or web address]  Organism: Blood Culture PCR  Klebsiella pneumoniae (29 Nov 2023 22:49)  Organism: Klebsiella pneumoniae (29 Nov 2023 22:49)      Method Type: CARLINE      -  Ampicillin: R >16 These ampicillin results predict results for amoxicillin      -  Ampicillin/Sulbactam: R >16/8      -  Aztreonam: S <=4      -  Cefazolin: S <=2      -  Cefepime: S <=2      -  Cefoxitin: S <=8      -  Ceftriaxone: S <=1      -  Ciprofloxacin: R 2      -  Ertapenem: S <=0.5      -  Gentamicin: S <=2      -  Imipenem: S <=1      -  Levofloxacin: S <=0.5      -  Meropenem: S <=1      -  Piperacillin/Tazobactam: S <=8      -  Tobramycin: R >8      -  Trimethoprim/Sulfamethoxazole: S 2/38  Organism: Blood Culture PCR (29 Nov 2023 22:49)      Method Type: PCR      -  K. pneumoniae group: Detec (K. pneumoniae, K. quasipneumoniae, K. variicola)        RADIOLOGY & ADDITIONAL TESTS:    Care Discussed with Consultants/Other Providers:    Patient is a 68y old  Female who presents with a chief complaint of Suspected UTI (01 Dec 2023 15:11)      Patient seen and examined at bedside. No overnight events reported. comfortable. denies any n/v. tolerating diet     ALLERGIES:  No Known Allergies    MEDICATIONS  (STANDING):  acetaminophen  Suppository .. 650 milliGRAM(s) Rectal every 6 hours  enoxaparin Injectable 40 milliGRAM(s) SubCutaneous every 24 hours  piperacillin/tazobactam IVPB.. 3.375 Gram(s) IV Intermittent every 8 hours    MEDICATIONS  (PRN):  haloperidol    Injectable 5 milliGRAM(s) IntraMuscular every 6 hours PRN Agitation  LORazepam   Injectable 1 milliGRAM(s) IntraMuscular every 6 hours PRN Agitation  ondansetron Injectable 4 milliGRAM(s) IV Push every 6 hours PRN Nausea and/or Vomiting    Vital Signs Last 24 Hrs  T(F): --  HR: --  BP: --  RR: --  SpO2: --  I&O's Summary    PHYSICAL EXAM:  General: NAD, A/O x 2 , not very cooperative   ENT: MMM, no thrush  Neck: Supple, No JVD  Lungs: Clear to auscultation bilaterally, good air entry, non-labored breathing  Cardio: RRR, S1/S2, No murmur  Abdomen: Soft, Nontender, Nondistended; Bowel sounds present  Extremities: No calf tenderness, No pitting edema    LABS:                                                Culture - Blood (collected 28 Nov 2023 09:10)  Source: .Blood Blood-Peripheral  Preliminary Report (02 Dec 2023 13:00):    No growth at 4 days    Culture - Blood (collected 28 Nov 2023 09:00)  Source: .Blood Blood-Venous  Preliminary Report (02 Dec 2023 13:00):    No growth at 4 days    Culture - Blood (collected 26 Nov 2023 22:52)  Source: .Blood Blood-Peripheral  Gram Stain (29 Nov 2023 23:53):    Growth in aerobic bottle: Gram Negative Rods    Growth in anaerobic bottle: Gram Negative Rods  Final Report (30 Nov 2023 18:39):    Growth in aerobic and anaerobic bottles: Klebsiella pneumoniae    See previous culture [id number]    Culture - Urine (collected 26 Nov 2023 22:47)  Source: Clean Catch Clean Catch (Midstream)  Final Report (27 Nov 2023 22:38):    No growth    Culture - Blood (collected 26 Nov 2023 22:47)  Source: .Blood Blood-Peripheral  Gram Stain (29 Nov 2023 07:19):    Growth in anaerobic bottle: Gram Negative Rods    Growth in aerobic bottle: Gram Negative Rods  Final Report (29 Nov 2023 22:49):    Growth in aerobic and anaerobic bottles: Klebsiella pneumoniae    Direct identification is available within approximately 3-5    hours either by Blood Panel Multiplexed PCR or Direct    MALDI-TOF. Details: [email or web address]  Organism: Blood Culture PCR  Klebsiella pneumoniae (29 Nov 2023 22:49)  Organism: Klebsiella pneumoniae (29 Nov 2023 22:49)      Method Type: CARLINE      -  Ampicillin: R >16 These ampicillin results predict results for amoxicillin      -  Ampicillin/Sulbactam: R >16/8      -  Aztreonam: S <=4      -  Cefazolin: S <=2      -  Cefepime: S <=2      -  Cefoxitin: S <=8      -  Ceftriaxone: S <=1      -  Ciprofloxacin: R 2      -  Ertapenem: S <=0.5      -  Gentamicin: S <=2      -  Imipenem: S <=1      -  Levofloxacin: S <=0.5      -  Meropenem: S <=1      -  Piperacillin/Tazobactam: S <=8      -  Tobramycin: R >8      -  Trimethoprim/Sulfamethoxazole: S 2/38  Organism: Blood Culture PCR (29 Nov 2023 22:49)      Method Type: PCR      -  K. pneumoniae group: Detec (K. pneumoniae, K. quasipneumoniae, K. variicola)        RADIOLOGY & ADDITIONAL TESTS:    Care Discussed with Consultants/Other Providers:    Patient is a 68y old  Female who presents with a chief complaint of Suspected UTI (01 Dec 2023 15:11)      Patient seen and examined at bedside. No overnight events reported. comfortable. denies any n/v. tolerating diet     ALLERGIES:  No Known Allergies    MEDICATIONS  (STANDING):  acetaminophen  Suppository .. 650 milliGRAM(s) Rectal every 6 hours  enoxaparin Injectable 40 milliGRAM(s) SubCutaneous every 24 hours  piperacillin/tazobactam IVPB.. 3.375 Gram(s) IV Intermittent every 8 hours    MEDICATIONS  (PRN):  haloperidol    Injectable 5 milliGRAM(s) IntraMuscular every 6 hours PRN Agitation  LORazepam   Injectable 1 milliGRAM(s) IntraMuscular every 6 hours PRN Agitation  ondansetron Injectable 4 milliGRAM(s) IV Push every 6 hours PRN Nausea and/or Vomiting    Vital Signs Last 24 Hrs  T(F): --  HR: --  BP: --  RR: --  SpO2: --  I&O's Summary    PHYSICAL EXAM:  General: NAD, A/O x 2 , not very cooperative   ENT: MMM, no thrush  Neck: Supple, No JVD  Lungs: Clear to auscultation bilaterally, good air entry, non-labored breathing  Cardio: RRR, S1/S2, No murmur  Abdomen: Soft, Nontender, Nondistended; Bowel sounds present  Extremities: No calf tenderness, No pitting edema    LABS:                                                Culture - Blood (collected 28 Nov 2023 09:10)  Source: .Blood Blood-Peripheral  Preliminary Report (02 Dec 2023 13:00):    No growth at 4 days    Culture - Blood (collected 28 Nov 2023 09:00)  Source: .Blood Blood-Venous  Preliminary Report (02 Dec 2023 13:00):    No growth at 4 days    Culture - Blood (collected 26 Nov 2023 22:52)  Source: .Blood Blood-Peripheral  Gram Stain (29 Nov 2023 23:53):    Growth in aerobic bottle: Gram Negative Rods    Growth in anaerobic bottle: Gram Negative Rods  Final Report (30 Nov 2023 18:39):    Growth in aerobic and anaerobic bottles: Klebsiella pneumoniae    See previous culture [id number]    Culture - Urine (collected 26 Nov 2023 22:47)  Source: Clean Catch Clean Catch (Midstream)  Final Report (27 Nov 2023 22:38):    No growth    Culture - Blood (collected 26 Nov 2023 22:47)  Source: .Blood Blood-Peripheral  Gram Stain (29 Nov 2023 07:19):    Growth in anaerobic bottle: Gram Negative Rods    Growth in aerobic bottle: Gram Negative Rods  Final Report (29 Nov 2023 22:49):    Growth in aerobic and anaerobic bottles: Klebsiella pneumoniae    Direct identification is available within approximately 3-5    hours either by Blood Panel Multiplexed PCR or Direct    MALDI-TOF. Details: [email or web address]  Organism: Blood Culture PCR  Klebsiella pneumoniae (29 Nov 2023 22:49)  Organism: Klebsiella pneumoniae (29 Nov 2023 22:49)      Method Type: CARLINE      -  Ampicillin: R >16 These ampicillin results predict results for amoxicillin      -  Ampicillin/Sulbactam: R >16/8      -  Aztreonam: S <=4      -  Cefazolin: S <=2      -  Cefepime: S <=2      -  Cefoxitin: S <=8      -  Ceftriaxone: S <=1      -  Ciprofloxacin: R 2      -  Ertapenem: S <=0.5      -  Gentamicin: S <=2      -  Imipenem: S <=1      -  Levofloxacin: S <=0.5      -  Meropenem: S <=1      -  Piperacillin/Tazobactam: S <=8      -  Tobramycin: R >8      -  Trimethoprim/Sulfamethoxazole: S 2/38  Organism: Blood Culture PCR (29 Nov 2023 22:49)      Method Type: PCR      -  K. pneumoniae group: Detec (K. pneumoniae, K. quasipneumoniae, K. variicola)        RADIOLOGY & ADDITIONAL TESTS:    Care Discussed with Consultants/Other Providers:

## 2023-12-02 NOTE — PROGRESS NOTE ADULT - ASSESSMENT
68yoF hx schizoaffective disorder, bipolar type sent from [place] facility for fever of 101.6, on admission found to have leukocytosis and abnormal UA.    >Sepsis on admission 2/2 bacteremia; UTI ruled out / GNR bacteremia  -repeat BCx neg   -UCx negative  -ID following  -Continue Zosyn  - f/u ct abd /p     > elevated lfts   - trend   - f/u ct abd     >Hx schizoaffective disorder, bipolar type with behavioral disturbance  -On long-acting haldol injections and haldol solution per transfer meds  -C/w IV haldol 2mg PRN agitation for now  -PRN Ativan per psych  -psych recs kenzie- clear for [facility] as calm and off restraints  -Spoke with ethics    >Prophylactic measure -Lovenox 40mg daily    >Dispo: Acute, likely back to [facility] after IV Abx course and CT abdo/pelvis       68yoF hx schizoaffective disorder, bipolar type sent from [place] facility for fever of 101.6, on admission found to have leukocytosis and abnormal UA.    >Sepsis on admission 2/2 bacteremia; UTI ruled out / GNR bacteremia  -repeat BCx neg   -UCx negative  -ID following  -Continue Zosyn  - f/u ct abd /p     > elevated lfts   - trend   - f/u ct abd     >Hx schizoaffective disorder, bipolar type with behavioral disturbance  -On long-acting haldol injections and haldol solution per transfer meds  -C/w IV haldol 2mg PRN agitation for now  -PRN Ativan per psych  -psych recs kenzie- clear for [facility] as calm and off restraints  -Spoke with ethics    >Prophylactic measure -Lovenox 40mg daily    >Dispo: Acute, likely back to [facility] after IV Abx course and CT abdo/pelvis       68yoF hx schizoaffective disorder, bipolar type sent from [place] facility for fever of 101.6, on admission found to have leukocytosis and abnormal UA.    >Sepsis on admission 2/2 bacteremia; UTI ruled out / GNR bacteremia  -repeat BCx neg   -UCx negative  -ID following  -Continue Zosyn  - f/u ct abd /p     > elevated lfts   - trend   - f/u ct abd     >Hx schizoaffective disorder, bipolar type with behavioral disturbance  -On long-acting haldol injections and haldol solution per transfer meds  -C/w IV haldol 2mg PRN agitation for now  -PRN Ativan per psych  -psych recs kenzie- clear for [facility] as calm and off restraints  -Spoke with ethics    >Prophylactic measure -Lovenox 40mg daily    >Dispo: Acute, likely back to [facility] after IV Abx course and CT abdo/pelvis

## 2023-12-02 NOTE — CHART NOTE - NSCHARTNOTEFT_GEN_A_CORE
Ethics continues to follow. See notes 11/28/23 and 11/30/23 about surrogate.     Updated Dr. Adamson (Medicine Attending) on above.     Of note, patient has a 2nd MR #    Radha Osorio MD  Ethics Attending  [phone] Ethics continues to follow. See notes 11/28/23 and 11/30/23 about surrogate.     Updated Dr. Adamson (Medicine Attending) on above.     Of note, patient has a 2nd MR #    Radha Osorio MD  Ethics Attending  [phone] Ethics continues to follow. See notes 11/28/23 and 11/30/23 about surrogate.     Updated Dr. Adamson (Medicine Attending) on above.     Of note, patient has a 2nd MR #    Radha Osorio MD  Ethics Attending  [phone] Ethics continues to follow. See notes 11/28/23 and 11/30/23 about surrogate.     Updated Dr. Adamson (Medicine Attending) on above.     Of note, patient has a 2nd MR #    Radha Osorio MD  Ethics Attending  [phone] Ethics continues to follow. See notes 11/28/23 and 11/30/23 about surrogate.     Updated Dr. Adamson (Medicine Attending) on above.     Radha Osorio MD  Ethics Attending  [phone] Ethics continues to follow. See notes 11/28/23 and 11/30/23 about surrogate.     Updated Dr. Adamson (Medicine Attending) on above.     Radha Osorio MD  Ethics Attending  [phone] Ethics continues to follow. See notes 11/28/23 and 11/30/23 about surrogate.     Updated Dr. Adamson (Medicine Attending) on above.     Radha Osorio MD  Ethics Attending  [phone] Ethics continues to follow. See notes 11/28/23 and 11/30/23 about surrogate.     Updated Dr. Adamson (Medicine Attending) on above.     Radha Osorio MD  Ethics Attending  [phone]

## 2023-12-03 LAB
CULTURE RESULTS: SIGNIFICANT CHANGE UP
SPECIMEN SOURCE: SIGNIFICANT CHANGE UP

## 2023-12-03 PROCEDURE — 99233 SBSQ HOSP IP/OBS HIGH 50: CPT

## 2023-12-03 RX ADMIN — PIPERACILLIN AND TAZOBACTAM 25 GRAM(S): 4; .5 INJECTION, POWDER, LYOPHILIZED, FOR SOLUTION INTRAVENOUS at 06:09

## 2023-12-03 NOTE — PROGRESS NOTE ADULT - ASSESSMENT
68yoF hx schizoaffective disorder, bipolar type sent from [place] facility for fever of 101.6, on admission found to have leukocytosis and abnormal UA.    >Sepsis on admission 2/2 bacteremia; UTI ruled out / GNR bacteremia  -repeat BCx 11/28 neg   -UCx negative  -ID following  -Continue Zosyn  - ct abd /p ordered x 2 , patient refused , will attempt to get us ruq     > elevated lfts   - trend   -ct abd / p refused   - check us ruq     >Hx schizoaffective disorder, bipolar type with behavioral disturbance  -On long-acting haldol injections and haldol solution per transfer meds  -C/w IV haldol 2mg PRN agitation for now  -PRN Ativan per psych  -psych recs kenzie- clear for [facility] as calm and off restraints  -ethics following     >Prophylactic measure -Lovenox 40mg daily    >Dispo: Acute, refusing ct abd , f/u us abd , likely back to [facility] after IV Abx course once medically stable. attempted to call sister, unable to reach        68yoF hx schizoaffective disorder, bipolar type sent from [place] facility for fever of 101.6, on admission found to have leukocytosis and abnormal UA.    >Sepsis on admission 2/2 bacteremia; UTI ruled out / GNR bacteremia  -repeat BCx 11/28 neg   -UCx negative  -ID following  -Continue Zosyn  - ct abd /p ordered x 2 , patient refused , will attempt to get us ruq     > elevated lfts   - trend   -ct abd / p refused   - check us ruq     >Hx schizoaffective disorder, bipolar type with behavioral disturbance  -On long-acting haldol injections and haldol solution per transfer meds  -C/w IV haldol 2mg PRN agitation for now  -PRN Ativan per psych  -psych recs kenzie- clear for [facility] as calm and off restraints  -ethics following     >Prophylactic measure -Lovenox 40mg daily    >Dispo: Acute, refusing ct abd , f/u us abd , likely back to [facility] after IV Abx course once medically stable. attempted to call sister, unable to reach        68yoF hx schizoaffective disorder, bipolar type sent from [place] facility for fever of 101.6, on admission found to have leukocytosis and abnormal UA.    >Sepsis on admission 2/2 bacteremia; UTI ruled out / GNR bacteremia  -repeat BCx 11/28 neg   -UCx negative  -ID following  -Continue Zosyn  - ct abd /p ordered x 2 , patient refused , will attempt to get us ruq     > elevated lfts   - trend   -ct abd / p refused   - check us ruq     >Hx schizoaffective disorder, bipolar type with behavioral disturbance  -On long-acting haldol injections and haldol solution per transfer meds  -C/w IV haldol 2mg PRN agitation for now  -PRN Ativan per psych  -psych recs kenzie- clear for [facility] as calm and off restraints  -ethics following     >Prophylactic measure -Lovenox 40mg daily    >Dispo: Acute, refusing ct abd , f/u us abd , likely back to [facility] after IV Abx course once medically stable. attempted to call sister, unable to reach        68yoF hx schizoaffective disorder, bipolar type sent from [place] facility for fever of 101.6, on admission found to have leukocytosis and abnormal UA.    >Sepsis on admission 2/2 bacteremia; UTI ruled out / GNR bacteremia  -repeat BCx 11/28 neg   -UCx negative  -ID following  -Continue Zosyn  - ct abd /p ordered x 2 , patient refused , will attempt to get us ruq     > elevated lfts   - trend   -ct abd / p refused   - check us ruq     >Hx schizoaffective disorder, bipolar type with behavioral disturbance  -On long-acting haldol injections and haldol solution per transfer meds  -C/w IV haldol 2mg PRN agitation for now  -PRN Ativan per psych  -psych recs kenzie- clear for [facility] as calm and off restraints  -ethics following     >Prophylactic measure -Lovenox 40mg daily    >Dispo: Acute, refusing ct abd , f/u us abd , likely back to [facility] after IV Abx course once medically stable. attempted to call sister, unable to reach

## 2023-12-03 NOTE — PROGRESS NOTE ADULT - SUBJECTIVE AND OBJECTIVE BOX
Patient is a 68y old  Female who presents with a chief complaint of Suspected UTI (02 Dec 2023 15:41)      Patient seen and examined at bedside. No overnight events reported. comfortable. in no acute distress. not very cooperative. refusing certain tests and vs     ALLERGIES:  No Known Allergies    MEDICATIONS  (STANDING):  acetaminophen  Suppository .. 650 milliGRAM(s) Rectal every 6 hours  enoxaparin Injectable 40 milliGRAM(s) SubCutaneous every 24 hours  piperacillin/tazobactam IVPB.. 3.375 Gram(s) IV Intermittent every 8 hours    MEDICATIONS  (PRN):  haloperidol    Injectable 5 milliGRAM(s) IntraMuscular every 6 hours PRN Agitation  LORazepam   Injectable 1 milliGRAM(s) IntraMuscular every 6 hours PRN Agitation  ondansetron Injectable 4 milliGRAM(s) IV Push every 6 hours PRN Nausea and/or Vomiting    Vital Signs Last 24 Hrs  T(F): --  HR: --  BP: --  RR: --  SpO2: --  I&O's Summary    PHYSICAL EXAM:  General: NAD, A/O x 2, not very cooperative   ENT: MMM, no thrush  Neck: Supple, No JVD  Lungs: Clear to auscultation bilaterally, good air entry, non-labored breathing  Cardio: RRR, S1/S2, No murmur  Abdomen: Soft, Nontender, Nondistended; Bowel sounds present  Extremities: No calf tenderness, No pitting edema    LABS:                                                Culture - Blood (collected 28 Nov 2023 09:10)  Source: .Blood Blood-Peripheral  Final Report (03 Dec 2023 13:01):    No growth at 5 days    Culture - Blood (collected 28 Nov 2023 09:00)  Source: .Blood Blood-Venous  Final Report (03 Dec 2023 13:01):    No growth at 5 days    Culture - Blood (collected 26 Nov 2023 22:52)  Source: .Blood Blood-Peripheral  Gram Stain (29 Nov 2023 23:53):    Growth in aerobic bottle: Gram Negative Rods    Growth in anaerobic bottle: Gram Negative Rods  Final Report (30 Nov 2023 18:39):    Growth in aerobic and anaerobic bottles: Klebsiella pneumoniae    See previous culture [id number]    Culture - Urine (collected 26 Nov 2023 22:47)  Source: Clean Catch Clean Catch (Midstream)  Final Report (27 Nov 2023 22:38):    No growth    Culture - Blood (collected 26 Nov 2023 22:47)  Source: .Blood Blood-Peripheral  Gram Stain (29 Nov 2023 07:19):    Growth in anaerobic bottle: Gram Negative Rods    Growth in aerobic bottle: Gram Negative Rods  Final Report (29 Nov 2023 22:49):    Growth in aerobic and anaerobic bottles: Klebsiella pneumoniae    Direct identification is available within approximately 3-5    hours either by Blood Panel Multiplexed PCR or Direct    MALDI-TOF. Details: [email or web address]  Organism: Blood Culture PCR  Klebsiella pneumoniae (29 Nov 2023 22:49)  Organism: Klebsiella pneumoniae (29 Nov 2023 22:49)      -  Levofloxacin: S <=0.5      -  Tobramycin: R >8      -  Aztreonam: S <=4      -  Gentamicin: S <=2      -  Cefazolin: S <=2      -  Cefepime: S <=2      -  Piperacillin/Tazobactam: S <=8      -  Ciprofloxacin: R 2      -  Imipenem: S <=1      -  Ceftriaxone: S <=1      -  Ampicillin: R >16 These ampicillin results predict results for amoxicillin      Method Type: CARLINE      -  Meropenem: S <=1      -  Ampicillin/Sulbactam: R >16/8      -  Cefoxitin: S <=8      -  Trimethoprim/Sulfamethoxazole: S 2/38      -  Ertapenem: S <=0.5  Organism: Blood Culture PCR (29 Nov 2023 22:49)      Method Type: PCR      -  K. pneumoniae group: Detec (K. pneumoniae, K. quasipneumoniae, K. variicola)        RADIOLOGY & ADDITIONAL TESTS:    Care Discussed with Consultants/Other Providers:    Patient is a 68y old  Female who presents with a chief complaint of Suspected UTI (02 Dec 2023 15:41)      Patient seen and examined at bedside. No overnight events reported. comfortable. in no acute distress. not very cooperative. refusing certain tests and vs     ALLERGIES:  No Known Allergies    MEDICATIONS  (STANDING):  acetaminophen  Suppository .. 650 milliGRAM(s) Rectal every 6 hours  enoxaparin Injectable 40 milliGRAM(s) SubCutaneous every 24 hours  piperacillin/tazobactam IVPB.. 3.375 Gram(s) IV Intermittent every 8 hours    MEDICATIONS  (PRN):  haloperidol    Injectable 5 milliGRAM(s) IntraMuscular every 6 hours PRN Agitation  LORazepam   Injectable 1 milliGRAM(s) IntraMuscular every 6 hours PRN Agitation  ondansetron Injectable 4 milliGRAM(s) IV Push every 6 hours PRN Nausea and/or Vomiting    Vital Signs Last 24 Hrs  T(F): --  HR: --  BP: --  RR: --  SpO2: --  I&O's Summary    PHYSICAL EXAM:  General: NAD, A/O x 2, not very cooperative   ENT: MMM, no thrush  Neck: Supple, No JVD  Lungs: Clear to auscultation bilaterally, good air entry, non-labored breathing  Cardio: RRR, S1/S2, No murmur  Abdomen: Soft, Nontender, Nondistended; Bowel sounds present  Extremities: No calf tenderness, No pitting edema    LABS:                                                Culture - Blood (collected 28 Nov 2023 09:10)  Source: .Blood Blood-Peripheral  Final Report (03 Dec 2023 13:01):    No growth at 5 days    Culture - Blood (collected 28 Nov 2023 09:00)  Source: .Blood Blood-Venous  Final Report (03 Dec 2023 13:01):    No growth at 5 days    Culture - Blood (collected 26 Nov 2023 22:52)  Source: .Blood Blood-Peripheral  Gram Stain (29 Nov 2023 23:53):    Growth in aerobic bottle: Gram Negative Rods    Growth in anaerobic bottle: Gram Negative Rods  Final Report (30 Nov 2023 18:39):    Growth in aerobic and anaerobic bottles: Klebsiella pneumoniae    See previous culture [id number]    Culture - Urine (collected 26 Nov 2023 22:47)  Source: Clean Catch Clean Catch (Midstream)  Final Report (27 Nov 2023 22:38):    No growth    Culture - Blood (collected 26 Nov 2023 22:47)  Source: .Blood Blood-Peripheral  Gram Stain (29 Nov 2023 07:19):    Growth in anaerobic bottle: Gram Negative Rods    Growth in aerobic bottle: Gram Negative Rods  Final Report (29 Nov 2023 22:49):    Growth in aerobic and anaerobic bottles: Klebsiella pneumoniae    Direct identification is available within approximately 3-5    hours either by Blood Panel Multiplexed PCR or Direct    MALDI-TOF. Details: [email or web address]  Organism: Blood Culture PCR  Klebsiella pneumoniae (29 Nov 2023 22:49)  Organism: Klebsiella pneumoniae (29 Nov 2023 22:49)      -  Levofloxacin: S <=0.5      -  Tobramycin: R >8      -  Aztreonam: S <=4      -  Gentamicin: S <=2      -  Cefazolin: S <=2      -  Cefepime: S <=2      -  Piperacillin/Tazobactam: S <=8      -  Ciprofloxacin: R 2      -  Imipenem: S <=1      -  Ceftriaxone: S <=1      -  Ampicillin: R >16 These ampicillin results predict results for amoxicillin      Method Type: CARLINE      -  Meropenem: S <=1      -  Ampicillin/Sulbactam: R >16/8      -  Cefoxitin: S <=8      -  Trimethoprim/Sulfamethoxazole: S 2/38      -  Ertapenem: S <=0.5  Organism: Blood Culture PCR (29 Nov 2023 22:49)      Method Type: PCR      -  K. pneumoniae group: Detec (K. pneumoniae, K. quasipneumoniae, K. variicola)        RADIOLOGY & ADDITIONAL TESTS:    Care Discussed with Consultants/Other Providers:    Patient is a 68y old  Female who presents with a chief complaint of Suspected UTI (02 Dec 2023 15:41)      Patient seen and examined at bedside. No overnight events reported. comfortable. in no acute distress. not very cooperative. refusing certain tests and vs     ALLERGIES:  No Known Allergies    MEDICATIONS  (STANDING):  acetaminophen  Suppository .. 650 milliGRAM(s) Rectal every 6 hours  enoxaparin Injectable 40 milliGRAM(s) SubCutaneous every 24 hours  piperacillin/tazobactam IVPB.. 3.375 Gram(s) IV Intermittent every 8 hours    MEDICATIONS  (PRN):  haloperidol    Injectable 5 milliGRAM(s) IntraMuscular every 6 hours PRN Agitation  LORazepam   Injectable 1 milliGRAM(s) IntraMuscular every 6 hours PRN Agitation  ondansetron Injectable 4 milliGRAM(s) IV Push every 6 hours PRN Nausea and/or Vomiting    Vital Signs Last 24 Hrs  T(F): --  HR: --  BP: --  RR: --  SpO2: --  I&O's Summary    PHYSICAL EXAM:  General: NAD, A/O x 2, not very cooperative   ENT: MMM, no thrush  Neck: Supple, No JVD  Lungs: Clear to auscultation bilaterally, good air entry, non-labored breathing  Cardio: RRR, S1/S2, No murmur  Abdomen: Soft, Nontender, Nondistended; Bowel sounds present  Extremities: No calf tenderness, No pitting edema    LABS:                                                Culture - Blood (collected 28 Nov 2023 09:10)  Source: .Blood Blood-Peripheral  Final Report (03 Dec 2023 13:01):    No growth at 5 days    Culture - Blood (collected 28 Nov 2023 09:00)  Source: .Blood Blood-Venous  Final Report (03 Dec 2023 13:01):    No growth at 5 days    Culture - Blood (collected 26 Nov 2023 22:52)  Source: .Blood Blood-Peripheral  Gram Stain (29 Nov 2023 23:53):    Growth in aerobic bottle: Gram Negative Rods    Growth in anaerobic bottle: Gram Negative Rods  Final Report (30 Nov 2023 18:39):    Growth in aerobic and anaerobic bottles: Klebsiella pneumoniae    See previous culture [id number]    Culture - Urine (collected 26 Nov 2023 22:47)  Source: Clean Catch Clean Catch (Midstream)  Final Report (27 Nov 2023 22:38):    No growth    Culture - Blood (collected 26 Nov 2023 22:47)  Source: .Blood Blood-Peripheral  Gram Stain (29 Nov 2023 07:19):    Growth in anaerobic bottle: Gram Negative Rods    Growth in aerobic bottle: Gram Negative Rods  Final Report (29 Nov 2023 22:49):    Growth in aerobic and anaerobic bottles: Klebsiella pneumoniae    Direct identification is available within approximately 3-5    hours either by Blood Panel Multiplexed PCR or Direct    MALDI-TOF. Details: [email or web address]  Organism: Blood Culture PCR  Klebsiella pneumoniae (29 Nov 2023 22:49)  Organism: Klebsiella pneumoniae (29 Nov 2023 22:49)      -  Levofloxacin: S <=0.5      -  Tobramycin: R >8      -  Aztreonam: S <=4      -  Gentamicin: S <=2      -  Cefazolin: S <=2      -  Cefepime: S <=2      -  Piperacillin/Tazobactam: S <=8      -  Ciprofloxacin: R 2      -  Imipenem: S <=1      -  Ceftriaxone: S <=1      -  Ampicillin: R >16 These ampicillin results predict results for amoxicillin      Method Type: CARLINE      -  Meropenem: S <=1      -  Ampicillin/Sulbactam: R >16/8      -  Cefoxitin: S <=8      -  Trimethoprim/Sulfamethoxazole: S 2/38      -  Ertapenem: S <=0.5  Organism: Blood Culture PCR (29 Nov 2023 22:49)      Method Type: PCR      -  K. pneumoniae group: Detec (K. pneumoniae, K. quasipneumoniae, K. variicola)        RADIOLOGY & ADDITIONAL TESTS:    Care Discussed with Consultants/Other Providers:    Patient is a 68y old  Female who presents with a chief complaint of Suspected UTI (02 Dec 2023 15:41)      Patient seen and examined at bedside. No overnight events reported. comfortable. in no acute distress. not very cooperative. refusing certain tests and vs     ALLERGIES:  No Known Allergies    MEDICATIONS  (STANDING):  acetaminophen  Suppository .. 650 milliGRAM(s) Rectal every 6 hours  enoxaparin Injectable 40 milliGRAM(s) SubCutaneous every 24 hours  piperacillin/tazobactam IVPB.. 3.375 Gram(s) IV Intermittent every 8 hours    MEDICATIONS  (PRN):  haloperidol    Injectable 5 milliGRAM(s) IntraMuscular every 6 hours PRN Agitation  LORazepam   Injectable 1 milliGRAM(s) IntraMuscular every 6 hours PRN Agitation  ondansetron Injectable 4 milliGRAM(s) IV Push every 6 hours PRN Nausea and/or Vomiting    Vital Signs Last 24 Hrs  T(F): --  HR: --  BP: --  RR: --  SpO2: --  I&O's Summary    PHYSICAL EXAM:  General: NAD, A/O x 2, not very cooperative   ENT: MMM, no thrush  Neck: Supple, No JVD  Lungs: Clear to auscultation bilaterally, good air entry, non-labored breathing  Cardio: RRR, S1/S2, No murmur  Abdomen: Soft, Nontender, Nondistended; Bowel sounds present  Extremities: No calf tenderness, No pitting edema    LABS:                                                Culture - Blood (collected 28 Nov 2023 09:10)  Source: .Blood Blood-Peripheral  Final Report (03 Dec 2023 13:01):    No growth at 5 days    Culture - Blood (collected 28 Nov 2023 09:00)  Source: .Blood Blood-Venous  Final Report (03 Dec 2023 13:01):    No growth at 5 days    Culture - Blood (collected 26 Nov 2023 22:52)  Source: .Blood Blood-Peripheral  Gram Stain (29 Nov 2023 23:53):    Growth in aerobic bottle: Gram Negative Rods    Growth in anaerobic bottle: Gram Negative Rods  Final Report (30 Nov 2023 18:39):    Growth in aerobic and anaerobic bottles: Klebsiella pneumoniae    See previous culture [id number]    Culture - Urine (collected 26 Nov 2023 22:47)  Source: Clean Catch Clean Catch (Midstream)  Final Report (27 Nov 2023 22:38):    No growth    Culture - Blood (collected 26 Nov 2023 22:47)  Source: .Blood Blood-Peripheral  Gram Stain (29 Nov 2023 07:19):    Growth in anaerobic bottle: Gram Negative Rods    Growth in aerobic bottle: Gram Negative Rods  Final Report (29 Nov 2023 22:49):    Growth in aerobic and anaerobic bottles: Klebsiella pneumoniae    Direct identification is available within approximately 3-5    hours either by Blood Panel Multiplexed PCR or Direct    MALDI-TOF. Details: [email or web address]  Organism: Blood Culture PCR  Klebsiella pneumoniae (29 Nov 2023 22:49)  Organism: Klebsiella pneumoniae (29 Nov 2023 22:49)      -  Levofloxacin: S <=0.5      -  Tobramycin: R >8      -  Aztreonam: S <=4      -  Gentamicin: S <=2      -  Cefazolin: S <=2      -  Cefepime: S <=2      -  Piperacillin/Tazobactam: S <=8      -  Ciprofloxacin: R 2      -  Imipenem: S <=1      -  Ceftriaxone: S <=1      -  Ampicillin: R >16 These ampicillin results predict results for amoxicillin      Method Type: CARLINE      -  Meropenem: S <=1      -  Ampicillin/Sulbactam: R >16/8      -  Cefoxitin: S <=8      -  Trimethoprim/Sulfamethoxazole: S 2/38      -  Ertapenem: S <=0.5  Organism: Blood Culture PCR (29 Nov 2023 22:49)      Method Type: PCR      -  K. pneumoniae group: Detec (K. pneumoniae, K. quasipneumoniae, K. variicola)        RADIOLOGY & ADDITIONAL TESTS:    Care Discussed with Consultants/Other Providers:

## 2023-12-04 VITALS — WEIGHT: 187.39 LBS

## 2023-12-04 PROCEDURE — 99232 SBSQ HOSP IP/OBS MODERATE 35: CPT

## 2023-12-04 RX ORDER — METRONIDAZOLE 500 MG
500 TABLET ORAL EVERY 8 HOURS
Refills: 0 | Status: DISCONTINUED | OUTPATIENT
Start: 2023-12-04 | End: 2023-12-05

## 2023-12-04 RX ORDER — CEFPODOXIME PROXETIL 100 MG
200 TABLET ORAL EVERY 12 HOURS
Refills: 0 | Status: DISCONTINUED | OUTPATIENT
Start: 2023-12-04 | End: 2023-12-05

## 2023-12-04 RX ADMIN — HALOPERIDOL DECANOATE 5 MILLIGRAM(S): 100 INJECTION INTRAMUSCULAR at 10:43

## 2023-12-04 NOTE — DIETITIAN INITIAL EVALUATION ADULT - ORAL INTAKE PTA/DIET HISTORY
Pt non-cooperative with nursing care, refusing nutrition interview at this time. Pt completing >75% of meals per documentation. RD to remain available for subjective interview as feasible/accepting.

## 2023-12-04 NOTE — PROGRESS NOTE ADULT - ASSESSMENT
68 F hx schizoaffective disorder, bipolar type sent from [facility] for fever of 101.6.  Hx obtained from chart review as pt sedated from receiving meds by ED and not providing any meaningful information at this time.  Pt with fever as above and was also noted to be tachycardic.  Pt refused to take BP at facility and per documentation as a tendency to not comply with vital sign checks and testing. It is unclear if pt reported any symptoms to staff,  Upon arrival to ED, pt refused vital signs and testing and had to be sedated with multiple agents.  Labs notable for leukocytosis and abnormal UA. Found to have klebsiella bacteremia. UA (-) unclear if she received antibiotics prior to arrival. Labs with leukocytosis and mildly elevated LFT, ? passed gallstone. suspected GI source    - f/u BCX 11/26 klebsiella pneumoniae (S) to zosyn  - f/u repeat BCX 11/28 NGTD  - f/u UCX ngtd  - Ct a/p was recommended but patient has been refusing  - USg abdomen ordered  - c/w zosyn-has refused since 12/1  - refusing vitals  - refusing labs  - since bacteremia has cleared suggest change to po abx vantin 200mg bid and flagyl 500mg q8h x 10 more days.   - Trend Fever  - Trend Leukocytosis  - Trend LFT  - repeat BCX after completing abx    d/w Dr Adamson  please call with questions 68 F hx schizoaffective disorder, bipolar type sent from [facility] for fever of 101.6.  Hx obtained from chart review as pt sedated from receiving meds by ED and not providing any meaningful information at this time.  Pt with fever as above and was also noted to be tachycardic.  Pt refused to take BP at facility and per documentation as a tendency to not comply with vital sign checks and testing. It is unclear if pt reported any symptoms to staff,  Upon arrival to ED, pt refused vital signs and testing and had to be sedated with multiple agents.  Labs notable for leukocytosis and abnormal UA. Found to have klebsiella bacteremia. UA (-) unclear if she received antibiotics prior to arrival. Labs with leukocytosis and mildly elevated LFT, ? passed gallstone. suspected GI source    - f/u BCX 11/26 klebsiella pneumoniae (S) to zosyn  - f/u repeat BCX 11/28 NGTD  - f/u UCX ngtd  - Ct a/p was recommended but patient has been refusing  - USg abdomen ordered  - c/w zosyn-has refused since 12/1  - refusing vitals  - refusing labs  - since bacteremia has cleared suggest change to po abx vantin 200mg bid and flagyl 500mg q8h x 10 more days.   - Trend Fever  - Trend Leukocytosis  - Trend LFT  - repeat BCX after completing abx    d/w Dr Adamson  please call with questions 68 F hx schizoaffective disorder, bipolar type sent from [facility] for fever of 101.6.  Hx obtained from chart review as pt sedated from receiving meds by ED and not providing any meaningful information at this time.  Pt with fever as above and was also noted to be tachycardic.  Pt refused to take BP at facility and per documentation as a tendency to not comply with vital sign checks and testing. It is unclear if pt reported any symptoms to staff,  Upon arrival to ED, pt refused vital signs and testing and had to be sedated with multiple agents.  Labs notable for leukocytosis and abnormal UA. Found to have klebsiella bacteremia. UA (-) unclear if she received antibiotics prior to arrival. Labs with leukocytosis and mildly elevated LFT, ? passed gallstone. suspected GI source    - f/u BCX 11/26 klebsiella pneumoniae (S) to zosyn  - f/u repeat BCX 11/28 NGTD  - f/u UCX ngtd  - Ct a/p was recommended but patient has been refusing  - USg abdomen ordered  - c/w zosyn-has refused since 12/1  - refusing vitals  - refusing labs  - since bacteremia has cleared suggest change to po abx vantin 200mg bid and flagyl 500mg q8h x 10 more days.   - Trend Fever  - Trend Leukocytosis  - Trend LFT  - repeat BCX after completing abx    d/w Dr Adamson  please call with questions 68 F hx schizoaffective disorder, bipolar type sent from [facility] for fever of 101.6.  Hx obtained from chart review as pt sedated from receiving meds by ED and not providing any meaningful information at this time.  Pt with fever as above and was also noted to be tachycardic.  Pt refused to take BP at facility and per documentation as a tendency to not comply with vital sign checks and testing. It is unclear if pt reported any symptoms to staff,  Upon arrival to ED, pt refused vital signs and testing and had to be sedated with multiple agents.  Labs notable for leukocytosis and abnormal UA. Found to have klebsiella bacteremia. UA (-) unclear if she received antibiotics prior to arrival. Labs with leukocytosis and mildly elevated LFT, ? passed gallstone. suspected GI source    - f/u BCX 11/26 klebsiella pneumoniae (S) to zosyn  - f/u repeat BCX 11/28 NGTD  - f/u UCX ngtd  - Ct a/p was recommended but patient has been refusing  - USg abdomen ordered  - c/w zosyn-has refused since 12/1  - refusing vitals  - refusing labs  - since bacteremia has cleared suggest change to po abx vantin 200mg bid and flagyl 500mg q8h x 10 more days.   - Trend Fever  - Trend Leukocytosis  - Trend LFT  - repeat BCX after completing abx    d/w Dr Adamson  please call with questions

## 2023-12-04 NOTE — DIETITIAN INITIAL EVALUATION ADULT - OTHER INFO
68yoF hx schizoaffective disorder, bipolar type sent from [facility] for fever of 101.6. Hx obtained from chart review as pt sedated from receiving meds by ED and not providing any meaningful information at this time. Pt with fever as above and was also noted to be tachycardic. Pt refused to take BP at facility and per documentation as a tendency to not comply with vital sign checks and testing. It is unclear if pt reported any symptoms to staff,  Upon arrival to ED, pt refused vital signs and testing and had to be sedated with multiple agents. 68yoF hx schizoaffective disorder, bipolar type sent from [facility] for fever of 101.6. Hx obtained from chart review as pt sedated from receiving meds by ED and not providing any meaningful information at this time. Pt with fever as above and was also noted to be tachycardic. Pt refused to take BP at facility and per documentation as a tendency to not comply with vital sign checks and testing. It is unclear if pt reported any symptoms to staff,  Upon arrival to ED, pt refused vital signs and testing and had to be sedated with multiple agents. 68yoF hx schizoaffective disorder, bipolar type sent from [facility] for fever of 101.6. Hx obtained from chart review as pt sedated from receiving meds by ED and not providing any meaningful information at this time. Pt with fever as above and was also noted to be tachycardic. Pt refused to take BP at facility and per documentation as a tendency to not comply with vital sign checks and testing. It is unclear if pt reported any symptoms to staff,  Upon arrival to ED, pt refused vital signs and testing and had to be sedated with multiple agents. 68yoF hx schizoaffective disorder, bipolar type sent from [facility] for fever of 101.6. Hx obtained from chart review as pt sedated from receiving meds by ED and not providing any meaningful information at this time. Pt with fever as above and was also noted to be tachycardic. Pt refused to take BP at facility and per documentation as a tendency to not comply with vital sign checks and testing. It is unclear if pt reported any symptoms to staff,  Upon arrival to ED, pt refused vital signs and testing and had to be sedated with multiple agents.

## 2023-12-04 NOTE — PROGRESS NOTE ADULT - PROVIDER SPECIALTY LIST ADULT
Hospitalist
Infectious Disease
Infectious Disease
Psychiatry
Hospitalist
Psychiatry
Psychiatry

## 2023-12-04 NOTE — PROGRESS NOTE ADULT - ASSESSMENT
68yoF hx schizoaffective disorder, bipolar type sent from [place] facility for fever of 101.6, on admission found to have leukocytosis and abnormal UA.    >Sepsis on admission 2/2 bacteremia; UTI ruled out / GNR bacteremia  -repeat BCx 11/28 neg   -UCx negative  -ID following  -refused iv  Zosyn after few doses   - ct abd /p ordered x 2 , patient refused , us ruq refused   - clinically looks  non toxic , no abd sxs   - changed to po abxs vantin and flagyl x 10 days , to have repeat surveillance  bcxs after abxs completed as op     > elevated lfts   - refused labs / imaging   - no abd pian /n/v     >Hx schizoaffective disorder, bipolar type with behavioral disturbance  -On long-acting haldol injections and haldol solution per transfer meds  -C/w IV haldol 2mg PRN agitation for now  -PRN Ativan per psych  -psych recs kenzie- clear for [facility] as calm and off restraints    >Prophylactic measure -Lovenox 40mg daily    >Dispo: Acute, refusing ct abd and us abd. chnaged to po abxs x 10 days . plan for dc back to [facility] in am if clinically stable.        68yoF hx schizoaffective disorder, bipolar type sent from [facility] facility for fever of 101.6, on admission found to have leukocytosis and abnormal UA.    >Sepsis on admission 2/2 bacteremia; UTI ruled out / GNR bacteremia  -repeat BCx 11/28 neg   -UCx negative  -ID following  -refused iv  Zosyn after few doses   - ct abd /p ordered x 2 , patient refused , us ruq refused   - clinically looks  non toxic , no abd sxs   - changed to po abxs vantin and flagyl x 10 days , to have repeat surveillance  bcxs after abxs completed as op     > elevated lfts   - refused labs / imaging   - no abd pian /n/v     >Hx schizoaffective disorder, bipolar type with behavioral disturbance  -On long-acting haldol injections and haldol solution per transfer meds  -C/w IV haldol 2mg PRN agitation for now  -PRN Ativan per psych  -psych recs kenzie- clear for [facility] as calm and off restraints    >Prophylactic measure -Lovenox 40mg daily    >Dispo: Acute, refusing ct abd and us abd. chnaged to po abxs x 10 days . plan for dc back to [facility] in am if clinically stable.        68yoF hx schizoaffective disorder, bipolar type sent from [place] facility for fever of 101.6, on admission found to have leukocytosis and abnormal UA.    >Sepsis on admission 2/2 bacteremia; UTI ruled out / GNR bacteremia  -repeat BCx 11/28 neg   -UCx negative  -ID following  -refused iv  Zosyn after few doses   - ct abd /p ordered x 2 , patient refused , us ruq refused   - clinically looks  non toxic , no abd sxs   - changed to po abxs vantin and flagyl x 10 days , to have repeat surveillance  bcxs after abxs completed as op     > elevated lfts   - refused labs / imaging   - no abd pian /n/v     >Hx schizoaffective disorder, bipolar type with behavioral disturbance  -On long-acting haldol injections and haldol solution per transfer meds  -C/w IV haldol 2mg PRN agitation for now  -PRN Ativan per psych  -psych recs kenzie- clear for [facility] as calm and off restraints    >Prophylactic measure -Lovenox 40mg daily    >Dispo: Acute, refusing ct abd and us abd. chnaged to po abxs x 10 days . plan for dc back to [facility] in am if clinically stable.        68yoF hx schizoaffective disorder, bipolar type sent from [place] facility for fever of 101.6, on admission found to have leukocytosis and abnormal UA.    >Sepsis on admission 2/2 bacteremia; UTI ruled out / GNR bacteremia  -repeat BCx 11/28 neg   -UCx negative  -ID following  -refused iv  Zosyn after few doses   - ct abd /p ordered x 2 , patient refused , us ruq refused   - clinically looks  non toxic , no abd sxs   - changed to po abxs vantin and flagyl x 10 days , to have repeat surveillance  bcxs after abxs completed as op     > elevated lfts   - refused labs / imaging   - no abd pian /n/v     >Hx schizoaffective disorder, bipolar type with behavioral disturbance  -On long-acting haldol injections and haldol solution per transfer meds  -C/w IV haldol 2mg PRN agitation for now  -PRN Ativan per psych  -psych recs kenzie- clear for [facility] as calm and off restraints    >Prophylactic measure -Lovenox 40mg daily    >Dispo: Acute, refusing ct abd and us abd. chnaged to po abxs x 10 days . plan for dc back to [facility] in am if clinically stable.

## 2023-12-04 NOTE — DIETITIAN INITIAL EVALUATION ADULT - PERTINENT MEDS FT
MEDICATIONS  (STANDING):  acetaminophen  Suppository .. 650 milliGRAM(s) Rectal every 6 hours  enoxaparin Injectable 40 milliGRAM(s) SubCutaneous every 24 hours  piperacillin/tazobactam IVPB.. 3.375 Gram(s) IV Intermittent every 8 hours    MEDICATIONS  (PRN):  haloperidol    Injectable 5 milliGRAM(s) IntraMuscular every 6 hours PRN Agitation  LORazepam   Injectable 1 milliGRAM(s) IntraMuscular every 6 hours PRN Agitation  ondansetron Injectable 4 milliGRAM(s) IV Push every 6 hours PRN Nausea and/or Vomiting

## 2023-12-04 NOTE — DIETITIAN INITIAL EVALUATION ADULT - NS FNS DIET ORDER
Skin normal color for race, warm, dry and intact. No evidence of rash. Diet, Regular (11-27-23 @ 12:07)

## 2023-12-04 NOTE — PROGRESS NOTE ADULT - SUBJECTIVE AND OBJECTIVE BOX
Patient is a 68y old  Female who presents with a chief complaint of Suspected UTI (04 Dec 2023 13:33)      Patient seen and examined at bedside. No overnight events reported. seen an glenny. comfortable. refusing labs , imaging , vs, iv abxs . clinically tolerating po diet , no abd pian or n/v. non toxic looking     ALLERGIES:  No Known Allergies    MEDICATIONS  (STANDING):  acetaminophen  Suppository .. 650 milliGRAM(s) Rectal every 6 hours  cefpodoxime 200 milliGRAM(s) Oral every 12 hours  enoxaparin Injectable 40 milliGRAM(s) SubCutaneous every 24 hours  metroNIDAZOLE    Tablet 500 milliGRAM(s) Oral every 8 hours    MEDICATIONS  (PRN):  haloperidol    Injectable 5 milliGRAM(s) IntraMuscular every 6 hours PRN Agitation  LORazepam   Injectable 1 milliGRAM(s) IntraMuscular every 6 hours PRN Agitation  ondansetron Injectable 4 milliGRAM(s) IV Push every 6 hours PRN Nausea and/or Vomiting    Vital Signs Last 24 Hrs  T(F): --  HR: --  BP: --  RR: --  SpO2: --  I&O's Summary    PHYSICAL EXAM:  General: NAD, A/O x 3  ENT: MMM, no thrush  Neck: Supple, No JVD  Lungs: Clear to auscultation bilaterally, good air entry, non-labored breathing  Cardio: RRR, S1/S2, No murmur  Abdomen: Soft, Nontender, Nondistended; Bowel sounds present  Extremities: No calf tenderness, No pitting edema    LABS:      Culture - Blood (collected 28 Nov 2023 09:10)  Source: .Blood Blood-Peripheral  Final Report (03 Dec 2023 13:01):    No growth at 5 days    Culture - Blood (collected 28 Nov 2023 09:00)  Source: .Blood Blood-Venous  Final Report (03 Dec 2023 13:01):    No growth at 5 days        RADIOLOGY & ADDITIONAL TESTS:    Care Discussed with Consultants/Other Providers:

## 2023-12-04 NOTE — PROGRESS NOTE ADULT - SUBJECTIVE AND OBJECTIVE BOX
[facility]                                                INFECTIOUS DISEASES  =======================================================                   Lloyd Olea#   Armani Reed MD#    Ismael Bolaños MD*                           Suzan Gallardo MD*   Deysi Rodriguez MD*           Diplomates American Board of Internal Medicine & Infectious Diseases                  # [place] Office - Appt - Tel  [phone] Fax [phone]                * [place] Office - Appt - Tel [phone] Fax [phone]                                  Hospital Consult line:  [phone]  =======================================================      [facility]-[id number]  SARAH BUTTERFIELD   follow up for: bacteremia  sitting up in chairs  calm  refusing vitals  refusing labs  refusing  exam  refusing cat scan  demanding her "Mail and bible"      I have personally reviewed the labs and data; pertinent labs and data are listed in this note; please see below.   ===================================================  REVIEW OF SYSTEMS:  limited , pt uncooperative    =======================================================  Allergies    No Known Allergies    Intolerances    Antibiotics:  piperacillin/tazobactam IVPB.. 3.375 Gram(s) IV Intermittent every 8 hours    Other medications:  acetaminophen  Suppository .. 650 milliGRAM(s) Rectal every 6 hours  enoxaparin Injectable 40 milliGRAM(s) SubCutaneous every 24 hours    ======================================================  Physical Exam:  ============  T(F): 98.7 (29 Nov 2023 17:38), Max: 101.3 (29 Nov 2023 04:28)  HR: 86 (29 Nov 2023 17:38)  BP: 145/85 (29 Nov 2023 17:38)  RR: 18 (29 Nov 2023 17:38)  SpO2: 97% (29 Nov 2023 17:38) (91% - 97%)  temp max in last 48H T(F): , Max: 102.8 (11-28-23 @ 14:22)    General:  No acute distress,sitting up in chair  Eye: no conjunctival pallor, no scleral icterus  refused exam    =======================================================  Labs:                        10.3   12.85 )-----------( 267      ( 29 Nov 2023 04:50 )             31.4     11-29    139  |  101  |  16.1  ----------------------------<  99  3.6   |  24.0  |  0.66    Ca    8.8      29 Nov 2023 04:50    TPro  6.8  /  Alb  2.8<L>  /  TBili  0.5  /  DBili  x   /  AST  38<H>  /  ALT  43<H>  /  AlkPhos  153<H>  11-29      Culture - Blood (collected 11-28-23 @ 09:10)  Source: .Blood Blood-Peripheral    Culture - Blood (collected 11-28-23 @ 09:00)  Source: .Blood Blood-Venous    Culture - Blood (collected 11-26-23 @ 22:52)  Source: .Blood Blood-Peripheral  Gram Stain (11-27-23 @ 21:57):    Growth in aerobic bottle: Gram Negative Rods  Final Report (11-29-23 @ 16:37):    Growth in aerobic bottle: Klebsiella pneumoniae    See previous culture [id number]    Culture - Urine (collected 11-26-23 @ 22:47)  Source: Clean Catch Clean Catch (Midstream)  Final Report (11-27-23 @ 22:38):    No growth    Culture - Blood (collected 11-26-23 @ 22:47)  Source: .Blood Blood-Peripheral  Gram Stain (11-29-23 @ 07:19):    Growth in anaerobic bottle: Gram Negative Rods    Growth in aerobic bottle: Gram Negative Rods  Organism: Blood Culture PCR  Klebsiella pneumoniae (11-29-23 @ 09:54)  Organism: Klebsiella pneumoniae (11-29-23 @ 09:54)    Sensitivities:      -  Ertapenem: S <=0.5      -  Levofloxacin: S <=0.5      -  Tobramycin: R >8      -  Aztreonam: S <=4      -  Gentamicin: S <=2      -  Cefazolin: S <=2      -  Cefepime: S <=2      -  Piperacillin/Tazobactam: S <=8      -  Ciprofloxacin: R 2      -  Imipenem: S <=1      -  Ceftriaxone: S <=1      -  Ampicillin: R >16 These ampicillin results predict results for amoxicillin      Method Type: CARLINE      -  Meropenem: S <=1      -  Ampicillin/Sulbactam: R >16/8      -  Cefoxitin: S <=8      -  Trimethoprim/Sulfamethoxazole: S 2/38  Organism: Blood Culture PCR (11-27-23 @ 22:08)    Sensitivities:      Method Type: PCR      -  K. pneumoniae group: Detec (K. pneumoniae, K. quasipneumoniae, K. variicola)                                                    [facility]                                                INFECTIOUS DISEASES  =======================================================                   Lloyd Olea#   Armani Reed MD#    Ismael Bolaños MD*                           Suzan Gallardo MD*   Deysi Rodriguez MD*           Diplomates American Board of Internal Medicine & Infectious Diseases                  # [place] Office - Appt - Tel  [phone] Fax [phone]                * [place] Office - Appt - Tel [phone] Fax [phone]                                  Hospital Consult line:  [phone]  =======================================================      [facility]-[id number]  SARAH BUTTERFIELD   follow up for: bacteremia  sitting up in chairs  calm  refusing vitals  refusing labs  refusing  exam  refusing cat scan  demanding her "Mail and bible"      I have personally reviewed the labs and data; pertinent labs and data are listed in this note; please see below.   ===================================================  REVIEW OF SYSTEMS:  limited , pt uncooperative    =======================================================  Allergies    No Known Allergies    Intolerances    Antibiotics:  piperacillin/tazobactam IVPB.. 3.375 Gram(s) IV Intermittent every 8 hours    Other medications:  acetaminophen  Suppository .. 650 milliGRAM(s) Rectal every 6 hours  enoxaparin Injectable 40 milliGRAM(s) SubCutaneous every 24 hours    ======================================================  Physical Exam:  ============  T(F): 98.7 (29 Nov 2023 17:38), Max: 101.3 (29 Nov 2023 04:28)  HR: 86 (29 Nov 2023 17:38)  BP: 145/85 (29 Nov 2023 17:38)  RR: 18 (29 Nov 2023 17:38)  SpO2: 97% (29 Nov 2023 17:38) (91% - 97%)  temp max in last 48H T(F): , Max: 102.8 (11-28-23 @ 14:22)    General:  No acute distress,sitting up in chair  Eye: no conjunctival pallor, no scleral icterus  refused exam    =======================================================  Labs:                        10.3   12.85 )-----------( 267      ( 29 Nov 2023 04:50 )             31.4     11-29    139  |  101  |  16.1  ----------------------------<  99  3.6   |  24.0  |  0.66    Ca    8.8      29 Nov 2023 04:50    TPro  6.8  /  Alb  2.8<L>  /  TBili  0.5  /  DBili  x   /  AST  38<H>  /  ALT  43<H>  /  AlkPhos  153<H>  11-29      Culture - Blood (collected 11-28-23 @ 09:10)  Source: .Blood Blood-Peripheral    Culture - Blood (collected 11-28-23 @ 09:00)  Source: .Blood Blood-Venous    Culture - Blood (collected 11-26-23 @ 22:52)  Source: .Blood Blood-Peripheral  Gram Stain (11-27-23 @ 21:57):    Growth in aerobic bottle: Gram Negative Rods  Final Report (11-29-23 @ 16:37):    Growth in aerobic bottle: Klebsiella pneumoniae    See previous culture [id number]    Culture - Urine (collected 11-26-23 @ 22:47)  Source: Clean Catch Clean Catch (Midstream)  Final Report (11-27-23 @ 22:38):    No growth    Culture - Blood (collected 11-26-23 @ 22:47)  Source: .Blood Blood-Peripheral  Gram Stain (11-29-23 @ 07:19):    Growth in anaerobic bottle: Gram Negative Rods    Growth in aerobic bottle: Gram Negative Rods  Organism: Blood Culture PCR  Klebsiella pneumoniae (11-29-23 @ 09:54)  Organism: Klebsiella pneumoniae (11-29-23 @ 09:54)    Sensitivities:      -  Ertapenem: S <=0.5      -  Levofloxacin: S <=0.5      -  Tobramycin: R >8      -  Aztreonam: S <=4      -  Gentamicin: S <=2      -  Cefazolin: S <=2      -  Cefepime: S <=2      -  Piperacillin/Tazobactam: S <=8      -  Ciprofloxacin: R 2      -  Imipenem: S <=1      -  Ceftriaxone: S <=1      -  Ampicillin: R >16 These ampicillin results predict results for amoxicillin      Method Type: CARLINE      -  Meropenem: S <=1      -  Ampicillin/Sulbactam: R >16/8      -  Cefoxitin: S <=8      -  Trimethoprim/Sulfamethoxazole: S 2/38  Organism: Blood Culture PCR (11-27-23 @ 22:08)    Sensitivities:      Method Type: PCR      -  K. pneumoniae group: Detec (K. pneumoniae, K. quasipneumoniae, K. variicola)                                                    [facility]                                                INFECTIOUS DISEASES  =======================================================                   Lloyd Olea#   Armani Reed MD#    Ismael Bolaños MD*                           Suzan Gallardo MD*   Deysi Rodriguez MD*           Diplomates American Board of Internal Medicine & Infectious Diseases                  # [place] Office - Appt - Tel  [phone] Fax [phone]                * [place] Office - Appt - Tel [phone] Fax [phone]                                  Hospital Consult line:  [phone]  =======================================================      [facility]-[id number]  SARAH BUTTERFIELD   follow up for: bacteremia  sitting up in chairs  calm  refusing vitals  refusing labs  refusing  exam  refusing cat scan  demanding her "Mail and bible"      I have personally reviewed the labs and data; pertinent labs and data are listed in this note; please see below.   ===================================================  REVIEW OF SYSTEMS:  limited , pt uncooperative    =======================================================  Allergies    No Known Allergies    Intolerances    Antibiotics:  piperacillin/tazobactam IVPB.. 3.375 Gram(s) IV Intermittent every 8 hours    Other medications:  acetaminophen  Suppository .. 650 milliGRAM(s) Rectal every 6 hours  enoxaparin Injectable 40 milliGRAM(s) SubCutaneous every 24 hours    ======================================================  Physical Exam:  ============  T(F): 98.7 (29 Nov 2023 17:38), Max: 101.3 (29 Nov 2023 04:28)  HR: 86 (29 Nov 2023 17:38)  BP: 145/85 (29 Nov 2023 17:38)  RR: 18 (29 Nov 2023 17:38)  SpO2: 97% (29 Nov 2023 17:38) (91% - 97%)  temp max in last 48H T(F): , Max: 102.8 (11-28-23 @ 14:22)    General:  No acute distress,sitting up in chair  Eye: no conjunctival pallor, no scleral icterus  refused exam    =======================================================  Labs:                        10.3   12.85 )-----------( 267      ( 29 Nov 2023 04:50 )             31.4     11-29    139  |  101  |  16.1  ----------------------------<  99  3.6   |  24.0  |  0.66    Ca    8.8      29 Nov 2023 04:50    TPro  6.8  /  Alb  2.8<L>  /  TBili  0.5  /  DBili  x   /  AST  38<H>  /  ALT  43<H>  /  AlkPhos  153<H>  11-29      Culture - Blood (collected 11-28-23 @ 09:10)  Source: .Blood Blood-Peripheral    Culture - Blood (collected 11-28-23 @ 09:00)  Source: .Blood Blood-Venous    Culture - Blood (collected 11-26-23 @ 22:52)  Source: .Blood Blood-Peripheral  Gram Stain (11-27-23 @ 21:57):    Growth in aerobic bottle: Gram Negative Rods  Final Report (11-29-23 @ 16:37):    Growth in aerobic bottle: Klebsiella pneumoniae    See previous culture [id number]    Culture - Urine (collected 11-26-23 @ 22:47)  Source: Clean Catch Clean Catch (Midstream)  Final Report (11-27-23 @ 22:38):    No growth    Culture - Blood (collected 11-26-23 @ 22:47)  Source: .Blood Blood-Peripheral  Gram Stain (11-29-23 @ 07:19):    Growth in anaerobic bottle: Gram Negative Rods    Growth in aerobic bottle: Gram Negative Rods  Organism: Blood Culture PCR  Klebsiella pneumoniae (11-29-23 @ 09:54)  Organism: Klebsiella pneumoniae (11-29-23 @ 09:54)    Sensitivities:      -  Ertapenem: S <=0.5      -  Levofloxacin: S <=0.5      -  Tobramycin: R >8      -  Aztreonam: S <=4      -  Gentamicin: S <=2      -  Cefazolin: S <=2      -  Cefepime: S <=2      -  Piperacillin/Tazobactam: S <=8      -  Ciprofloxacin: R 2      -  Imipenem: S <=1      -  Ceftriaxone: S <=1      -  Ampicillin: R >16 These ampicillin results predict results for amoxicillin      Method Type: CARLINE      -  Meropenem: S <=1      -  Ampicillin/Sulbactam: R >16/8      -  Cefoxitin: S <=8      -  Trimethoprim/Sulfamethoxazole: S 2/38  Organism: Blood Culture PCR (11-27-23 @ 22:08)    Sensitivities:      Method Type: PCR      -  K. pneumoniae group: Detec (K. pneumoniae, K. quasipneumoniae, K. variicola)                                                    [facility]                                                INFECTIOUS DISEASES  =======================================================                   Lloyd Olea#   Armani Reed MD#    Ismael Bolaños MD*                           Suzan Gallardo MD*   Deysi Rodriguez MD*           Diplomates American Board of Internal Medicine & Infectious Diseases                  # [place] Office - Appt - Tel  [phone] Fax [phone]                * [place] Office - Appt - Tel [phone] Fax [phone]                                  Hospital Consult line:  [phone]  =======================================================      [facility]-[id number]  SARAH BUTTERFIELD   follow up for: bacteremia  sitting up in chairs  calm  refusing vitals  refusing labs  refusing  exam  refusing cat scan  demanding her "Mail and bible"      I have personally reviewed the labs and data; pertinent labs and data are listed in this note; please see below.   ===================================================  REVIEW OF SYSTEMS:  limited , pt uncooperative    =======================================================  Allergies    No Known Allergies    Intolerances    Antibiotics:  piperacillin/tazobactam IVPB.. 3.375 Gram(s) IV Intermittent every 8 hours    Other medications:  acetaminophen  Suppository .. 650 milliGRAM(s) Rectal every 6 hours  enoxaparin Injectable 40 milliGRAM(s) SubCutaneous every 24 hours    ======================================================  Physical Exam:  ============  T(F): 98.7 (29 Nov 2023 17:38), Max: 101.3 (29 Nov 2023 04:28)  HR: 86 (29 Nov 2023 17:38)  BP: 145/85 (29 Nov 2023 17:38)  RR: 18 (29 Nov 2023 17:38)  SpO2: 97% (29 Nov 2023 17:38) (91% - 97%)  temp max in last 48H T(F): , Max: 102.8 (11-28-23 @ 14:22)    General:  No acute distress,sitting up in chair  Eye: no conjunctival pallor, no scleral icterus  refused exam    =======================================================  Labs:                        10.3   12.85 )-----------( 267      ( 29 Nov 2023 04:50 )             31.4     11-29    139  |  101  |  16.1  ----------------------------<  99  3.6   |  24.0  |  0.66    Ca    8.8      29 Nov 2023 04:50    TPro  6.8  /  Alb  2.8<L>  /  TBili  0.5  /  DBili  x   /  AST  38<H>  /  ALT  43<H>  /  AlkPhos  153<H>  11-29      Culture - Blood (collected 11-28-23 @ 09:10)  Source: .Blood Blood-Peripheral    Culture - Blood (collected 11-28-23 @ 09:00)  Source: .Blood Blood-Venous    Culture - Blood (collected 11-26-23 @ 22:52)  Source: .Blood Blood-Peripheral  Gram Stain (11-27-23 @ 21:57):    Growth in aerobic bottle: Gram Negative Rods  Final Report (11-29-23 @ 16:37):    Growth in aerobic bottle: Klebsiella pneumoniae    See previous culture [id number]    Culture - Urine (collected 11-26-23 @ 22:47)  Source: Clean Catch Clean Catch (Midstream)  Final Report (11-27-23 @ 22:38):    No growth    Culture - Blood (collected 11-26-23 @ 22:47)  Source: .Blood Blood-Peripheral  Gram Stain (11-29-23 @ 07:19):    Growth in anaerobic bottle: Gram Negative Rods    Growth in aerobic bottle: Gram Negative Rods  Organism: Blood Culture PCR  Klebsiella pneumoniae (11-29-23 @ 09:54)  Organism: Klebsiella pneumoniae (11-29-23 @ 09:54)    Sensitivities:      -  Ertapenem: S <=0.5      -  Levofloxacin: S <=0.5      -  Tobramycin: R >8      -  Aztreonam: S <=4      -  Gentamicin: S <=2      -  Cefazolin: S <=2      -  Cefepime: S <=2      -  Piperacillin/Tazobactam: S <=8      -  Ciprofloxacin: R 2      -  Imipenem: S <=1      -  Ceftriaxone: S <=1      -  Ampicillin: R >16 These ampicillin results predict results for amoxicillin      Method Type: CARLINE      -  Meropenem: S <=1      -  Ampicillin/Sulbactam: R >16/8      -  Cefoxitin: S <=8      -  Trimethoprim/Sulfamethoxazole: S 2/38  Organism: Blood Culture PCR (11-27-23 @ 22:08)    Sensitivities:      Method Type: PCR      -  K. pneumoniae group: Detec (K. pneumoniae, K. quasipneumoniae, K. variicola)

## 2023-12-05 PROCEDURE — 99239 HOSP IP/OBS DSCHRG MGMT >30: CPT

## 2023-12-05 RX ORDER — CEFPODOXIME PROXETIL 100 MG
1 TABLET ORAL
Qty: 18 | Refills: 0
Start: 2023-12-05 | End: 2023-12-13

## 2023-12-05 RX ORDER — METRONIDAZOLE 500 MG
1 TABLET ORAL
Qty: 27 | Refills: 0
Start: 2023-12-05 | End: 2023-12-13

## 2023-12-05 NOTE — DISCHARGE NOTE NURSING/CASE MANAGEMENT/SOCIAL WORK - PATIENT PORTAL LINK FT
You can access the FollowMyHealth Patient Portal offered by [facility] by registering at the following website: [email or web address]. By joining [employer]’s FollowMyHealth portal, you will also be able to view your health information using other applications (apps) compatible with our system. You can access the FollowMyHealth Patient Portal offered by [facility] by registering at the following website: [email or web address]. By joining [employer]’s FollowMyHealth portal, you will also be able to view your health information using other applications (apps) compatible with our system. You can access the FollowMyHealth Patient Portal offered by [facility] by registering at the following website: [email or web address]. By joining [employer]’s FollowMyHealth portal, you will also be able to view your health information using other applications (apps) compatible with our system. You can access the FollowMyHealth Patient Portal offered by [facility] by registering at the following website: [email or web address]. By joining [employer]’s FollowMyHealth portal, you will also be able to view your health information using other applications (apps) compatible with our system.

## 2023-12-05 NOTE — DISCHARGE NOTE PROVIDER - PROVIDER TOKENS
PROVIDER:[TOKEN:[[id number]],FOLLOWUP:[1 week]],FREE:[LAST:[psychiatry],PHONE:[(   )    -],FAX:[(   )    -],FOLLOWUP:[1-3 days]],FREE:[LAST:[primary care],PHONE:[(   )    -],FAX:[(   )    -],FOLLOWUP:[1-3 days]] PROVIDER:[TOKEN:[[id number]],FOLLOWUP:[1 week]],FREE:[LAST:[psychiatry],PHONE:[(   )    -],FAX:[(   )    -],FOLLOWUP:[1-3 days]],FREE:[LAST:[primary care],PHONE:[(   )    -],FAX:[(   )    -],FOLLOWUP:[1-3 days]] PROVIDER:[TOKEN:[[id number]],FOLLOWUP:[1 week]],FREE:[LAST:[psychiatry],PHONE:[(   )    -],FAX:[(   )    -],FOLLOWUP:[1-3 days]],FREE:[LAST:[primary care],PHONE:[(   )    -],FAX:[(   )    -],FOLLOWUP:[1-3 days]] PROVIDER:[TOKEN:[[id number]],FOLLOWUP:[1 week]],FREE:[LAST:[psychiatry],PHONE:[(   )    -],FAX:[(   )    -],FOLLOWUP:[1-3 days]],FREE:[LAST:[primary care],PHONE:[(   )    -],FAX:[(   )    -],FOLLOWUP:[1-3 days]]

## 2023-12-05 NOTE — DISCHARGE NOTE NURSING/CASE MANAGEMENT/SOCIAL WORK - NSDCFUADDAPPT_GEN_ALL_CORE_FT
Patient has been accepted to return to [facility] ([phone]). Dr. Adamson completed doc to doc. Dr Adamson reported accepting MD at [facility] is Dr. Mead. BONNIE sent patient's labs, medications, and discharge summary to [facility] upon discharge (fax: [phone]). BONNIE spoke with [facility] liakasandra Oswald and Afia- both confirmed patient can return today on next available ambulance.  Patient has been accepted to return to [facility] ([phone]). Dr. Adamson completed doc to doc. Dr Adamson reported accepting MD at [facility] is Dr. Mead. BONNIE sent patient's labs, medications, and discharge summary to [facility] upon discharge (fax: [phone]). BONNIE spoke with [facility] liakasandra Oswald and Aifa- both confirmed patient can return today on next available ambulance.  Patient has been accepted to return to [facility] ([phone]). Dr. Adamson completed doc to doc. Dr Adamson reported accepting MD at [facility] is Dr. Mead. BONNIE sent patient's labs, medications, and discharge summary to [facility] upon discharge (fax: [phone]). BONNIE spoke with [facility] liakasandra Oswald and Afia- both confirmed patient can return today on next available ambulance.  Patient has been accepted to return to [facility] ([phone]). Dr. Adamson completed doc to doc. Dr Adamson reported accepting MD at [facility] is Dr. Mead. BONNIE sent patient's labs, medications, and discharge summary to [facility] upon discharge (fax: [phone]). BONNIE spoke with [facility] liakasandra Oswald and Afia- both confirmed patient can return today on next available ambulance.

## 2023-12-05 NOTE — DISCHARGE NOTE NURSING/CASE MANAGEMENT/SOCIAL WORK - NSDCPEFALRISK_GEN_ALL_CORE
For information on Fall & Injury Prevention, visit: https://www.[facility]/news/fall-prevention-protects-and-maintains-health-and-mobility OR  https://www.[facility]/news/fall-prevention-tips-[email or web address]-injury OR  https://www.cdc.gov/steadi/patient.html For information on Fall & Injury Prevention, visit: https://www.[facility]/news/fall-prevention-protects-and-maintains-health-and-mobility OR  https://www.[facility]/news/fall-prevention-tips-to-avoid-injury OR  https://www.cdc.gov/steadi/patient.html For information on Fall & Injury Prevention, visit: https://www.[facility]/news/fall-prevention-protects-and-maintains-health-and-mobility OR  https://www.[facility]/news/fall-prevention-tips-to-avoid-injury OR  https://www.cdc.gov/steadi/patient.html For information on Fall & Injury Prevention, visit: https://www.[facility]/news/fall-prevention-protects-and-maintains-health-and-mobility OR  https://www.[facility]/news/fall-prevention-tips-to-avoid-injury OR  https://www.cdc.gov/steadi/patient.html

## 2023-12-05 NOTE — DISCHARGE NOTE PROVIDER - NSDCMRMEDTOKEN_GEN_ALL_CORE_FT
cholecalciferol 1250 mcg (50,000 intl units) oral capsule: 1 cap(s) orally once a week on Thursdays  haloperidol 1 mg/mL oral concentrate: 10 milligram(s) orally once a day  haloperidol 1 mg/mL oral concentrate: 5 milligram(s) orally once a day (at bedtime)  haloperidol decanoate 100 mg/mL intramuscular solution: 100 milligram(s) intramuscularly every 28 days   cefpodoxime 200 mg oral tablet: 1 tab(s) orally every 12 hours  metroNIDAZOLE 500 mg oral tablet: 1 tab(s) orally every 8 hours

## 2023-12-05 NOTE — DISCHARGE NOTE PROVIDER - HOSPITAL COURSE
68yoF hx schizoaffective disorder, bipolar type sent from [place] facility for fever of 101.6, on admission found to have leukocytosis and abnormal UA.    >Sepsis on admission 2/2 bacteremia; UTI ruled out / GNR bacteremia/ unclear source. . repeat BCx 11/28 neg . UCx negative. refused iv  Zosyn after few doses . ct abd /p ordered x 2 , patient refused , us ruq refused . clinically looks  non toxic , no abd sxs . changed to po abxs vantin and flagyl x 10 days , to have repeat surveillance  bcxs after abxs completed as op. patient needs us RUQ  as out patient in week.     > elevated lfts . refused labs / imaging . no abd pian /n/v . trend. clinically stable. tolerating diet     >Hx schizoaffective disorder, bipolar type with behavioral disturbance. resume psych meds as per .      chnaged to po abxs x 10 days .  dc back to [place].  clinically stable.      68yoF hx schizoaffective disorder, bipolar type sent from [place] facility for fever of 101.6, on admission found to have leukocytosis and abnormal UA.    >Sepsis on admission 2/2 bacteremia; UTI ruled out / GNR bacteremia/ unclear source. . repeat BCx 11/28 neg . UCx negative. refused iv  Zosyn after few doses . ct abd /p ordered x 2 , patient refused , us ruq refused . clinically looks  non toxic , no abd sxs . changed to po abxs vantin and flagyl x 10 days , to have repeat surveillance  bcxs after abxs completed as op. patient needs us RUQ  as out patient in week.     > elevated lfts . refused labs / imaging . no abd pian /n/v . trend. clinically stable. tolerating diet     >Hx schizoaffective disorder, bipolar type with behavioral disturbance. resume psych meds as per .      chnaged to po abxs x 10 days .  dc back to [place].  clinically stable.      68yoF hx schizoaffective disorder, bipolar type sent from [place] facility for fever of 101.6, on admission found to have leukocytosis and abnormal UA.    >Sepsis on admission 2/2 bacteremia; UTI ruled out / GNR bacteremia/ unclear source. . repeat BCx 11/28 neg . UCx negative. refused iv  Zosyn after few doses . ct abd /p ordered x 2 , patient refused , us ruq refused . clinically looks  non toxic , no abd sxs . changed to po abxs vantin and flagyl x 10 days , to have repeat surveillance  bcxs after abxs completed as op. patient needs us RUQ  as out patient in week.     > elevated lfts . refused labs / imaging . no abd pian /n/v . trend. clinically stable. tolerating diet     >Hx schizoaffective disorder, bipolar type with behavioral disturbance. resume psych meds as per .      chnaged to po abxs x 10 days .  dc back to [place].  clinically stable.      68yoF hx schizoaffective disorder, bipolar type sent from [place] facility for fever of 101.6, on admission found to have leukocytosis and abnormal UA.    >Sepsis on admission 2/2 bacteremia; UTI ruled out / GNR bacteremia/ unclear source. . repeat BCx 11/28 neg . UCx negative. refused iv  Zosyn after few doses . ct abd /p ordered x 2 , patient refused , us ruq refused . clinically looks  non toxic , no abd sxs . changed to po abxs vantin and flagyl x 10 days , to have repeat surveillance  bcxs after abxs completed as op. patient needs us RUQ  as out patient in week.     > elevated lfts . refused labs / imaging . no abd pian /n/v . trend. clinically stable. tolerating diet     >Hx schizoaffective disorder, bipolar type with behavioral disturbance. resume psych meds as per .      chnaged to po abxs x 10 days .  dc back to [place].  clinically stable.

## 2023-12-05 NOTE — DISCHARGE NOTE NURSING/CASE MANAGEMENT/SOCIAL WORK - NSPROEXTENSIONSOFSELF_GEN_A_NUR
Notes recorded by Nesha Aguila NP on 7/26/2018 at 12:19 PM CDT  Hgba1c up from 9 months ago. Pt to work on her diet, and exercise. Recheck again in 6 months.   Notes recorded by Wes Barreto MD on 7/26/2018 at 8:41 AM CDT  Lipids are improved   CPM for none

## 2023-12-05 NOTE — DISCHARGE NOTE PROVIDER - CARE PROVIDER_API CALL
Suzan Gallardo  Infectious Disease  [address] [place] [address]  Phone: [phone]  Fax: [phone]  Follow Up Time: 1 week    psychiatry,   Phone: (   )    -  Fax: (   )    -  Follow Up Time: 1-3 days    primary care,   Phone: (   )    -  Fax: (   )    -  Follow Up Time: 1-3 days   Suzan Gallardo  Infectious Disease  [address] [place] [address]  Phone: [phone]  Fax: [phone]  Follow Up Time: 1 week    psychiatry,   Phone: (   )    -  Fax: (   )    -  Follow Up Time: 1-3 days    primary care,   Phone: (   )    -  Fax: (   )    -  Follow Up Time: 1-3 days   Suzan Gallardo  Infectious Disease  [address] [place] [address]  Phone: [phone]  Fax: [phone]  Follow Up Time: 1 week    psychiatry,   Phone: (   )    -  Fax: (   )    -  Follow Up Time: 1-3 days    primary care,   Phone: (   )    -  Fax: (   )    -  Follow Up Time: 1-3 days   Suzan Gallardo  Infectious Disease  [address] [place] [address]  Phone: [phone]  Fax: [phone]  Follow Up Time: 1 week    psychiatry,   Phone: (   )    -  Fax: (   )    -  Follow Up Time: 1-3 days    primary care,   Phone: (   )    -  Fax: (   )    -  Follow Up Time: 1-3 days

## 2023-12-05 NOTE — DISCHARGE NOTE PROVIDER - ATTENDING DISCHARGE PHYSICAL EXAMINATION:
PHYSICAL EXAM:  General: NAD, A/O x 3  ENT: MMM, no thrush  Neck: Supple, No JVD  Lungs: Clear to auscultation bilaterally, good air entry, non-labored breathing  Cardio: RRR, S1/S2, No murmur  Abdomen: Soft, Nontender, Nondistended; Bowel sounds present  Extremities: No calf tenderness, No pitting edema

## 2023-12-05 NOTE — DISCHARGE NOTE PROVIDER - NPI NUMBER (FOR SYSADMIN USE ONLY) :
[[id number]],[UNKNOWN],[UNKNOWN] [[id number]],[UNKNOWN],[UNKNOWN] [[id number]],[UNKNOWN],[UNKNOWN] [[id number]],[UNKNOWN],[UNKNOWN]

## 2023-12-05 NOTE — DISCHARGE NOTE NURSING/CASE MANAGEMENT/SOCIAL WORK - SOCIAL WORKER'S NAME
Aida Saleem, [facility]  Aida Saleem, [facility]  Aida Saleem, [facility]  Aida Saleem, [facility]

## 2024-01-16 RX ORDER — CHOLECALCIFEROL (VITAMIN D3) 125 MCG
1 CAPSULE ORAL
Refills: 0 | DISCHARGE

## 2024-01-16 RX ORDER — HALOPERIDOL DECANOATE 100 MG/ML
100 INJECTION INTRAMUSCULAR
Refills: 0 | DISCHARGE

## 2024-01-16 RX ORDER — HALOPERIDOL DECANOATE 100 MG/ML
10 INJECTION INTRAMUSCULAR
Refills: 0 | DISCHARGE

## 2024-01-16 RX ORDER — HALOPERIDOL DECANOATE 100 MG/ML
5 INJECTION INTRAMUSCULAR
Refills: 0 | DISCHARGE
